# Patient Record
Sex: MALE | Race: BLACK OR AFRICAN AMERICAN | Employment: FULL TIME | URBAN - METROPOLITAN AREA
[De-identification: names, ages, dates, MRNs, and addresses within clinical notes are randomized per-mention and may not be internally consistent; named-entity substitution may affect disease eponyms.]

---

## 2017-06-02 ENCOUNTER — TELEPHONE (OUTPATIENT)
Dept: FAMILY MEDICINE | Facility: CLINIC | Age: 57
End: 2017-06-02

## 2017-06-02 NOTE — TELEPHONE ENCOUNTER
6/2/2017    Call Regarding Preventive Health Screening Colonoscopy    Attempt 1    Message on voicemail     Comments:       Outreach   gavin

## 2017-11-14 ENCOUNTER — HOSPITAL ENCOUNTER (OUTPATIENT)
Facility: CLINIC | Age: 57
Discharge: HOME | End: 2017-11-14
Attending: SPECIALIST | Admitting: SPECIALIST
Payer: COMMERCIAL

## 2017-11-14 ENCOUNTER — SURGERY (OUTPATIENT)
Age: 57
End: 2017-11-14

## 2017-11-14 VITALS
OXYGEN SATURATION: 96 % | RESPIRATION RATE: 17 BRPM | SYSTOLIC BLOOD PRESSURE: 125 MMHG | DIASTOLIC BLOOD PRESSURE: 86 MMHG | WEIGHT: 180 LBS | BODY MASS INDEX: 27.28 KG/M2 | HEIGHT: 68 IN

## 2017-11-14 LAB — COLONOSCOPY: NORMAL

## 2017-11-14 PROCEDURE — 88305 TISSUE EXAM BY PATHOLOGIST: CPT | Performed by: SPECIALIST

## 2017-11-14 PROCEDURE — 99153 MOD SED SAME PHYS/QHP EA: CPT | Performed by: SPECIALIST

## 2017-11-14 PROCEDURE — 88305 TISSUE EXAM BY PATHOLOGIST: CPT | Mod: 26 | Performed by: SPECIALIST

## 2017-11-14 PROCEDURE — G0500 MOD SEDAT ENDO SERVICE >5YRS: HCPCS

## 2017-11-14 PROCEDURE — 45385 COLONOSCOPY W/LESION REMOVAL: CPT | Performed by: SPECIALIST

## 2017-11-14 PROCEDURE — 25000128 H RX IP 250 OP 636: Performed by: SPECIALIST

## 2017-11-14 RX ORDER — LIDOCAINE 40 MG/G
CREAM TOPICAL
Status: DISCONTINUED | OUTPATIENT
Start: 2017-11-14 | End: 2017-11-14 | Stop reason: HOSPADM

## 2017-11-14 RX ORDER — ONDANSETRON 2 MG/ML
4 INJECTION INTRAMUSCULAR; INTRAVENOUS
Status: DISCONTINUED | OUTPATIENT
Start: 2017-11-14 | End: 2017-11-14 | Stop reason: HOSPADM

## 2017-11-14 RX ORDER — FENTANYL CITRATE 50 UG/ML
INJECTION, SOLUTION INTRAMUSCULAR; INTRAVENOUS PRN
Status: DISCONTINUED | OUTPATIENT
Start: 2017-11-14 | End: 2017-11-14 | Stop reason: HOSPADM

## 2017-11-14 RX ADMIN — MIDAZOLAM HYDROCHLORIDE 0.5 MG: 1 INJECTION, SOLUTION INTRAMUSCULAR; INTRAVENOUS at 07:52

## 2017-11-14 RX ADMIN — MIDAZOLAM HYDROCHLORIDE 0.5 MG: 1 INJECTION, SOLUTION INTRAMUSCULAR; INTRAVENOUS at 07:47

## 2017-11-14 RX ADMIN — MIDAZOLAM HYDROCHLORIDE 1 MG: 1 INJECTION, SOLUTION INTRAMUSCULAR; INTRAVENOUS at 07:40

## 2017-11-14 RX ADMIN — FENTANYL CITRATE 50 MCG: 50 INJECTION, SOLUTION INTRAMUSCULAR; INTRAVENOUS at 07:40

## 2017-11-14 RX ADMIN — FENTANYL CITRATE 25 MCG: 50 INJECTION, SOLUTION INTRAMUSCULAR; INTRAVENOUS at 07:47

## 2017-11-14 RX ADMIN — FENTANYL CITRATE 25 MCG: 50 INJECTION, SOLUTION INTRAMUSCULAR; INTRAVENOUS at 07:52

## 2017-11-14 NOTE — BRIEF OP NOTE
[facility] Brief Operative Note    Pre-operative diagnosis: Screening   Post-operative diagnosis polyp     Procedure: Procedure(s):  Colonoscopy - Wound Class: II-Clean Contaminated   Surgeon(s): Surgeon(s) and Role:     * Raad Valentino MD - Primary   Estimated blood loss: * No values recorded between 11/14/2017 12:00 AM and 11/14/2017  8:18 AM *    Specimens:   ID Type Source Tests Collected by Time Destination   A :  Polyp Large Intestine, Hepatic Flexure SURGICAL PATHOLOGY EXAM Raad Valentino MD 11/14/2017  7:58 AM       Findings: Please see ProVation procedure note in Chart Review

## 2017-11-15 LAB — COPATH REPORT: NORMAL

## 2017-12-07 ENCOUNTER — TELEPHONE (OUTPATIENT)
Dept: FAMILY MEDICINE | Facility: CLINIC | Age: 57
End: 2017-12-07

## 2018-05-21 ENCOUNTER — OFFICE VISIT (OUTPATIENT)
Dept: FAMILY MEDICINE | Facility: CLINIC | Age: 58
End: 2018-05-21
Payer: COMMERCIAL

## 2018-05-21 ENCOUNTER — RADIANT APPOINTMENT (OUTPATIENT)
Dept: GENERAL RADIOLOGY | Facility: CLINIC | Age: 58
End: 2018-05-21
Attending: FAMILY MEDICINE
Payer: COMMERCIAL

## 2018-05-21 VITALS
HEIGHT: 68 IN | SYSTOLIC BLOOD PRESSURE: 132 MMHG | BODY MASS INDEX: 27.89 KG/M2 | RESPIRATION RATE: 16 BRPM | HEART RATE: 73 BPM | TEMPERATURE: 98 F | DIASTOLIC BLOOD PRESSURE: 75 MMHG | WEIGHT: 184 LBS

## 2018-05-21 DIAGNOSIS — Z00.00 ROUTINE GENERAL MEDICAL EXAMINATION AT A HEALTH CARE FACILITY: ICD-10-CM

## 2018-05-21 DIAGNOSIS — Z11.4 ENCOUNTER FOR SCREENING FOR HIV: ICD-10-CM

## 2018-05-21 DIAGNOSIS — M54.42 LOW BACK PAIN WITH LEFT-SIDED SCIATICA, UNSPECIFIED BACK PAIN LATERALITY, UNSPECIFIED CHRONICITY: ICD-10-CM

## 2018-05-21 DIAGNOSIS — R05.9 COUGH: ICD-10-CM

## 2018-05-21 DIAGNOSIS — R20.1 HYPOESTHESIA: ICD-10-CM

## 2018-05-21 DIAGNOSIS — M75.102 ROTATOR CUFF SYNDROME, LEFT: ICD-10-CM

## 2018-05-21 DIAGNOSIS — M54.2 CERVICALGIA: Primary | ICD-10-CM

## 2018-05-21 DIAGNOSIS — Z11.59 NEED FOR HEPATITIS C SCREENING TEST: ICD-10-CM

## 2018-05-21 PROCEDURE — 87389 HIV-1 AG W/HIV-1&-2 AB AG IA: CPT | Performed by: FAMILY MEDICINE

## 2018-05-21 PROCEDURE — 71046 X-RAY EXAM CHEST 2 VIEWS: CPT

## 2018-05-21 PROCEDURE — 86803 HEPATITIS C AB TEST: CPT | Performed by: FAMILY MEDICINE

## 2018-05-21 PROCEDURE — 86480 TB TEST CELL IMMUN MEASURE: CPT | Performed by: FAMILY MEDICINE

## 2018-05-21 PROCEDURE — 36415 COLL VENOUS BLD VENIPUNCTURE: CPT | Performed by: FAMILY MEDICINE

## 2018-05-21 PROCEDURE — 99214 OFFICE O/P EST MOD 30 MIN: CPT | Performed by: FAMILY MEDICINE

## 2018-05-21 RX ORDER — BUPROPION HYDROCHLORIDE 150 MG/1
150 TABLET ORAL EVERY MORNING
Qty: 30 TABLET | Refills: 1 | Status: SHIPPED | OUTPATIENT
Start: 2018-05-21 | End: 2021-09-27

## 2018-05-21 NOTE — MR AVS SNAPSHOT
After Visit Summary   5/21/2018    Phani Alba    MRN: [id number]           Patient Information     Date Of Birth          1960        Visit Information        Provider Department      5/21/2018 9:15 AM Manan Thornton MD [facility]        Today's Diagnoses     Cervicalgia    -  1    Rotator cuff syndrome, left        Cough        Need for hepatitis C screening test        Encounter for screening for HIV        Routine general medical examination at a health care facility        Hypoesthesia        Low back pain with left-sided sciatica, unspecified back pain laterality, unspecified chronicity           Follow-ups after your visit        Additional Services     [facility] PT, HAND, AND CHIROPRACTIC REFERRAL       **This order will print in the [facility] Scheduling Office**    Physical Therapy, Hand Therapy and Chiropractic Care are available through:    *[facility]  *[facility]  *[facility]    Call one number to schedule at any of the above locations: [phone].    Your provider has referred you to:     Indication/Reason for Referral: Cervicalgia, left radicular hypoesthesia  Onset of Illness: weeks  Therapy Orders: Evaluate and Treat  Special Programs: None and [facility]  Special Request:     Alicja Helm      Additional Comments for the Therapist or Chiropractor:     Please be aware that coverage of these services is subject to the terms and limitations of your health insurance plan.  Call member services at your health plan with any benefit or coverage questions.      Please bring the following to your appointment:    *Your personal calendar for scheduling future appointments  *Comfortable clothing                  Follow-up notes from your care team     Return in about 6 weeks (around 7/2/2018).      Future tests that were ordered for you today     Open Future Orders        Priority Expected Expires Ordered    MR  "Shoulder Left w/o Contrast Routine  2019            Who to contact     If you have questions or need follow up information about today's clinic visit or your schedule please contact [facility] directly at [phone].  Normal or non-critical lab and imaging results will be communicated to you by MyChart, letter or phone within 4 business days after the clinic has received the results. If you do not hear from us within 7 days, please contact the clinic through Simparelhart or phone. If you have a critical or abnormal lab result, we will notify you by phone as soon as possible.  Submit refill requests through Travolver or call your pharmacy and they will forward the refill request to us. Please allow 3 business days for your refill to be completed.          Additional Information About Your Visit        MyCharVetr Information     Travolver lets you send messages to your doctor, view your test results, renew your prescriptions, schedule appointments and more. To sign up, go to [email or web address] . Click on \"Log in\" on the left side of the screen, which will take you to the Welcome page. Then click on \"Sign up Now\" on the right side of the page.     You will be asked to enter the access code listed below, as well as some personal information. Please follow the directions to create your username and password.     Your access code is: [email or web address]  Expires: 2018 10:34 AM     Your access code will  in 90 days. If you need help or a new code, please call your [facility] or [phone].        Care EveryWhere ID     This is your Care EveryWhere ID. This could be used by other organizations to access your [facility] medical records  [id number]        Your Vitals Were     Pulse Temperature Respirations Height BMI (Body Mass Index)       73 98  F (36.7  C) (Oral) 16 5' 8\" (1.727 m) 27.98 kg/m2        Blood Pressure from Last 3 Encounters:   18 132/75   17 125/86 "   02/04/16 138/85    Weight from Last 3 Encounters:   05/21/18 184 lb (83.5 kg)   11/14/17 180 lb (81.6 kg)   02/04/16 180 lb 9.6 oz (81.9 kg)              We Performed the Following     Hepatitis C Screen Reflex to HCV RNA Quant and Genotype     HIV Antigen Antibody Combo     [facility] PT, HAND, AND CHIROPRACTIC REFERRAL     M Tuberculosis by Quantiferon          Today's Medication Changes          These changes are accurate as of 5/21/18 12:27 PM.  If you have any questions, ask your nurse or doctor.               Start taking these medicines.        Dose/Directions    buPROPion 150 MG 24 hr tablet   Commonly known as:  WELLBUTRIN XL   Used for:  Cervicalgia   Started by:  Manan Thornton MD        Dose:  150 mg   Take 1 tablet (150 mg) by mouth every morning   Quantity:  30 tablet   Refills:  1         Stop taking these medicines if you haven't already. Please contact your care team if you have questions.     indomethacin 50 MG capsule   Commonly known as:  INDOCIN   Stopped by:  Manan Thornton MD           NO ACTIVE MEDICATIONS   Stopped by:  Manan Thornton MD           sildenafil 100 MG tablet   Commonly known as:  VIAGRA   Stopped by:  Manan hTornton MD                Where to get your medicines      These medications were sent to [facility] [place] [id number]     Phone:  [phone]     buPROPion 150 MG 24 hr tablet                Primary Care Provider Office Phone # Fax #    Manan Thornton [phone] [address] [place] [id number]        Equal Access to Services     [facility]: Hadii aad ku hadasho Soomaali, waaxda luqadaha, qaybta kaalmada adeegyada, taisha dowling hayjessica han . So [facility] [phone].    ATENCIÓN: Si habla español, tiene a graham disposición servicios gratuitos de asistencia lingüística. Llame al [phone].    We comply with applicable federal civil rights laws and  [place] laws. We do not discriminate on the basis of race, color, national origin, age, disability, sex, sexual orientation, or gender identity.            Thank you!     Thank you for choosing [facility]  for your care. Our goal is always to provide you with excellent care. Hearing back from our patients is one way we can continue to improve our services. Please take a few minutes to complete the written survey that you may receive in the mail after your visit with us. Thank you!             Your Updated Medication List - Protect others around you: Learn how to safely use, store and throw away your medicines at www.disposemymeds.org.          This list is accurate as of 5/21/18 12:27 PM.  Always use your most recent med list.                   Brand Name Dispense Instructions for use Diagnosis    buPROPion 150 MG 24 hr tablet    WELLBUTRIN XL    30 tablet    Take 1 tablet (150 mg) by mouth every morning    Cervicalgia

## 2018-05-21 NOTE — PROGRESS NOTES
SUBJECTIVE:   Phani Alba is a 57 year old male who presents to clinic today for the following health issues:    Cough   of unclear duration, episodic  Rotator cuff syndrome, left. 1.5 yers no trauma. Cannot abduct/flex beyond 90 degrees  Routine general medical examination at a health care facility. HIV, HEP C duse  Hypoesthesia left leg.foot, palliatives and provocatives not noted   Low back pain with left-sided sciatica, unspecified back pain laterality, unspecified chronicity. In the past, not currently  Cervicalgia 6 weeks, unresponsive to tylenol. No radiation, palliatives and provocatives not noted no trauma  Neck Pain  Works night shift and normally rests his head on the table when he is tired. Wonders if this caused this   Onset: 1 and a half months ago     Description:   Location: bilaterally  Radiation: into the head    Intensity: moderate    Progression of Symptoms:  worsening    Accompanying Signs & Symptoms:  Burning, prickly sensation (paresthesias) in arm(s): no   Numbness in arm(s): no   Weakness in arm(s):  no   Fever: no   Headache: no   Nausea and/or vomiting: no     History:   Trauma: no   Previous neck pain: no   Previous surgery or injections: no   Previous Imaging (MRI,X ray): no     Precipitating factors:   Does movement increase the pain:  YES    Alleviating factors:      Therapies Tried and outcome:  Tylenol         Problem list and histories reviewed & adjusted, as indicated.  Additional history: as documented      Past Medical History:   Diagnosis Date     ED (erectile dysfunction) 12/11/2014     Hypoesthesia 5/21/2018     Hypoesthesia 5/21/2018     Keloid of skin      Low back pain with left-sided sciatica, unspecified back pain laterality, unspecified chronicity 5/21/2018     Rotator cuff syndrome, left 5/21/2018     Sebaceous cyst 12/11/2014     Tubular adenoma of colon        Past Surgical History:   Procedure Laterality Date     APPENDECTOMY         Family History   Problem  "Relation Age of Onset     Family History Negative No family hx of        Social History   Substance Use Topics     Smoking status: Never Smoker     Smokeless tobacco: Never Used     Alcohol use Yes      Comment: occ       Reviewed and updated as needed this visit by clinical staff  Tobacco  Allergies  Meds  Problems  Fam Hx  Soc Hx      Reviewed and updated as needed this visit by Provider  Allergies  Meds  Problems         ROS:    ENT: NEGATIVE for congestion and soar throat  No CP        This document serves as a record of the services and decisions personally performed and made by Manan Thornton MD. It was created on his behalf by Renzo Fisher, a trained medical scribe.  The creation of this document is based on the scribe's personal observations and the provider's statements to the medical scribe.  Renzo Fisher, May 21, 2018 10:04 AM    OBJECTIVE:     /75 (BP Location: Left arm, Patient Position: Chair, Cuff Size: Adult Large)  Pulse 73  Temp 98  F (36.7  C) (Oral)  Resp 16  Ht 5' 8\" (1.727 m)  Wt 184 lb (83.5 kg)  BMI 27.98 kg/m2  Body mass index is 27.98 kg/(m^2).    NECK: FROM, no neurotension signs, no cervical adenopathy noted, tenderness to palpation along trapezius , nuchal ridge  Affect flat, CN II-XII grossly symmetric     MS: Reduced range of motion L arm, unable to abduct beyond 90 degrees.   Neg SLR  Diagnostic Test Results:  No results found for this or any previous visit (from the past 24 hour(s)).  cxr neg  ASSESSMENT/PLAN:   ASSESSMENT / PLAN:  (M54.2) Cervicalgia  (primary encounter diagnosis)  Comment: refer treat  Plan: [facility] PT, HAND, AND CHIROPRACTIC REFERRAL,         buPROPion (WELLBUTRIN XL) 150 MG 24 hr tablet            (M75.102) Rotator cuff syndrome, left  Comment: suspect derangement  Plan: MR Shoulder Left w/o Contrast            (R05) Cough  Comment: broaden data base    Plan: M Tuberculosis by Quantiferon, XR Chest 2 Views        Does not appear related to " above    (Z11.59) Need for hepatitis C screening test  Comment: routine discussed  Plan: Hepatitis C Screen Reflex to HCV RNA Quant and         Genotype            (Z11.4) Encounter for screening for HIV  Comment: routine discussed  Plan: HIV Antigen Antibody Combo            (Z00.00) Routine general medical examination at a health care facility  Comment: discussed  Plan: Hepatitis C Screen Reflex to HCV RNA Quant and         Genotype, HIV Antigen Antibody Combo            (R20.1) Hypoesthesia  Comment: with Dx below, suspect radicular cause  Plan: [facility] PT, HAND, AND CHIROPRACTIC REFERRAL            (M54.42) Low back pain with left-sided sciatica, unspecified back pain laterality, unspecified chronicity  Comment: History of, no current pain, but radicular Sx  Plan: [facility] PT, HAND, AND CHIROPRACTIC REFERRAL              RTC 6 weeks    Manan Thornton MD        The information in this document, created by the medical scribe for me, accurately reflects the services I personally performed and the decisions made by me. I have reviewed and approved this document for accuracy prior to leaving the patient care area.  Manan Thornton MD May 21, 2018 10:04 AM

## 2018-05-21 NOTE — LETTER
May 25, 2018      Phani Alba  [address] [address]        Dear ,    We are writing to inform you of your test results.    All tests are normal, except that tuberculosis test.  As you can see, they recommend retesting it in 1 or 2 months    Resulted Orders   Hepatitis C Screen Reflex to HCV RNA Quant and Genotype   Result Value Ref Range    Hepatitis C Antibody Nonreactive NR^Nonreactive      Comment:      Assay performance characteristics have not been established for newborns,   infants, and children     HIV Antigen Antibody Combo   Result Value Ref Range    HIV Antigen Antibody Combo Nonreactive NR^Nonreactive          Comment:      HIV-1 p24 Ag & HIV-1/HIV-2 Ab Not Detected   M Tuberculosis by Quantiferon   Result Value Ref Range    M Tuberculosis Result Indeterminate (A) NEG^Negative      Comment:      Results are indeterminate, possible impaired immune response.  Consider   submitting a repeat sample in 1 to 2 months.      M Tuberculosis Antigen Value 0.00 IU/mL      Comment:      This is a qualitative test.  The TB antigen IU/mL value is required for   documentation on certain government reporting forms but this value should not   be used to monitor disease progression or response to therapy.  Diagnosing or excluding tuberculosis disease, and assessing the probability of   LTBI, require a combination of epidemiological, historical, medical and   diagnostic findings that should be taken into account when interpreting   QuantiFERON TB results.         If you have any questions or concerns, please call the clinic at the number listed above.       Sincerely,        Manan Thornton MD/AL

## 2018-05-22 LAB
HCV AB SERPL QL IA: NONREACTIVE
HIV 1+2 AB+HIV1 P24 AG SERPL QL IA: NONREACTIVE

## 2018-05-23 LAB
M TB TUBERC IFN-G BLD QL: ABNORMAL
M TB TUBERC IFN-G/MITOGEN IGNF BLD: 0 IU/ML

## 2018-05-24 ENCOUNTER — TELEPHONE (OUTPATIENT)
Dept: FAMILY MEDICINE | Facility: CLINIC | Age: 58
End: 2018-05-24

## 2018-05-24 DIAGNOSIS — R76.11 NONSPECIFIC REACTION TO TUBERCULIN SKIN TEST WITHOUT ACTIVE TUBERCULOSIS: Primary | ICD-10-CM

## 2018-05-24 NOTE — PROGRESS NOTES
All tests are normal, except that tuberculosis test.  As you can see, they recommend retesting it in 1 or 2 months  QUEENIE PACHECO

## 2018-06-06 ENCOUNTER — THERAPY VISIT (OUTPATIENT)
Dept: PHYSICAL THERAPY | Facility: CLINIC | Age: 58
End: 2018-06-06
Payer: COMMERCIAL

## 2018-06-06 DIAGNOSIS — M54.2 CERVICALGIA: Primary | ICD-10-CM

## 2018-06-06 PROCEDURE — 97161 PT EVAL LOW COMPLEX 20 MIN: CPT | Mod: GP | Performed by: PHYSICAL THERAPIST

## 2018-06-06 PROCEDURE — 97110 THERAPEUTIC EXERCISES: CPT | Mod: GP | Performed by: PHYSICAL THERAPIST

## 2018-06-06 PROCEDURE — 97112 NEUROMUSCULAR REEDUCATION: CPT | Mod: GP | Performed by: PHYSICAL THERAPIST

## 2018-06-06 NOTE — PROGRESS NOTES
[facility] Initial Evaluation -- Cervical    Evaluation Date: June 6, 2018  Phani Alba is a 57 year old male with a cervical condition.   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    HISTORY:    Present symptoms:  B neck.  Pain quality (sharp/shooting/stabbing/aching/burning/cramping):   aching.  Paresthesia (yes/no):  no    Present since (onset date): 05/15/2018.     Symptoms (improving/unchanging/worsening):  improving.    Symptoms commenced as a result of: unknown--pt wonders if sitting at his work with his head resting forward on a table contributed   Condition occurred in the following environment:  unknown    Symptoms at onset (neck/arm/forearm/headache): B neck pain  Constant symptoms (neck/arm/forearm/headache): B neck pain  Intermittent symptoms (neck/arm/forearm/headache): none    Symptoms are made worse with the following: Always Bending, Always Sitting 1 hour, Always Turning, time of day - Always AM, Always When still; always looking down  Symptoms are made better with the following: Always When still    Disturbed sleep (yes/no): yes  Number of pillows: 1  Sleeping postures (prone/sup/side R/L): sides    Previous episodes (0/1-5/6-10/11+): 0 Year of first episode: na    Previous history: none  Previous treatments: na    Specific Questions: (as reported by the patient)  Dizziness/Tinnitus/Nausea/Swallowing (pos/neg): neg  Gait/Upper Limbs (normal/abnormal): normal  Medications (nil/NSAIDS/anlag/steroids/anticoag/other):  Other - Anti-depressants  Medical allergies:  none  General health  (excellent/good/fair/poor):  good  Pertinent medical history:  None  Imaging (None/Xray/MRI/Other):  none  Recent or major surgery (yes/no): no  Night pain (yes/no): no  Accidents (yes/no): no  Unexplained weight loss (yes/no): no  Barriers at home: no  Other red flags: no    EXAMINATION    Posture:   Sitting (good/fair/poor): poor  Standing (good/fair/poor): good     Protruded head (yes/no): yes    Wry Neck (right/left/nil):  nil  Relevant (yes/no):  na     Correction of posture(better/worse/no effect): NE  Other observations:  none    Neurological:    Motor Deficit:  Not assessed--no radicular complaints   Reflexes:  Not assessed--no radicular complaints  Sensory Deficit:  Not assessed--no radicular complaints   Dural signs:  Not assessed--no radicular complaints      Movement Loss:   Jasper Mod Min Nil Pain   Protrusion    x NE   Flexion    x Increases  B neck pain   Retraction   x  Increases B neck pain   Extension   x  Increases B neck pain   Lateral flexion R    x Increases L neck pain   Lateral flexion L    x Increases R neck pain   Rotation R  x x  Increases L neck pain   Rotation L  x x  Increases R neck pain     Test Movements:   During: produces, abolishes, increases, decreases, no effect, centralizing, peripheralizing  After: better, worse, no better, no worse, no effect, centralized, peripheralized    Pretest symptoms sitting: B neck pain 7/10   Symptoms During Symptoms After ROM increased ROM decreased No Effect   PRO        Rep PRO        RET No Effect No Effect      Rep RET W/self-OP--  decreases Better x     RET EXT No Effect No Effect      Rep RET EXT Decreases Better x     Pretest symptoms lying:     Symptoms During Symptoms After ROM increased ROM decreased No Effect   RET        Rep RET        RET EXT        Rep RET EXT        If required, pretest symptoms sitting:      Symptoms During Symptoms After ROM increased ROM decreased No Effect   LF-R        Rep LF-R        LF-L        Rep LF-L         ROT-R        Rep ROT-R        ROT-L        Rep ROT-L        FLEX        Rep FLEX            Static Tests:   Protrusion:    Flexion:    Retraction:    Extension (sitting/prone/supine):      Other Tests:     Provisional Classification:  Derangement - Bilateral, symmetrical, symptoms above elbow    Principle of Management:  Education:  Posture--use of lumbar roll in sitting, avoid fwd head, importance of posture    Equipment provided:  Purchased lumbar roll  Mechanical therapy (Y/N):  y   Extension principle:  Seated cervical retraction/extension x10 reps, every 2 hours   Lateral principle:    Flexion principle:     Other:      ASSESSMENT/PLAN:    Patient is a 57 year old male with cervical complaints.  Provisional classification of derangement with directional preference for extension.  He had limited B rotation and extension ROM initially that improved with repeated seated cervical retraction/extension.  He will try at home to assess further.  Treatment will focus on cervical retraction/extension exercises in addition to posture and body mechanics training to improve cervical mechanics, decrease pain, and improve overall function.     Patient has the following significant findings with corresponding treatment plan.                Diagnosis 1:  Neck pain  Pain -  self management, education, directional preference exercise and home program  Decreased ROM/flexibility - manual therapy, therapeutic exercise and home program  Decreased function - therapeutic activities and home program  Impaired posture - neuro re-education and home program    Therapy Evaluation Codes:   1) History comprised of:   Personal factors that impact the plan of care:      None.    Comorbidity factors that impact the plan of care are:      None.     Medications impacting care: None.  2) Examination of Body Systems comprised of:   Body structures and functions that impact the plan of care:      Cervical spine.   Activity limitations that impact  the plan of care are:      Bending, Driving and Sitting.  3) Clinical presentation characteristics are:   Stable/Uncomplicated.2  4) Decision-Making    Low complexity using standardized patient assessment instrument and/or measureable assessment of functional outcome.  Cumulative Therapy Evaluation is: Low complexity.    Previous and current functional limitations:  (See Goal Flow Sheet for this information)    Short term and Long term goals: (See Goal Flow Sheet for this information)     Communication ability:  Patient appears to be able to clearly communicate and understand verbal and written communication and follow directions correctly.  Treatment Explanation - The following has been discussed with the patient:   RX ordered/plan of care  Anticipated outcomes  Possible risks and side effects  This patient would benefit from PT intervention to resume normal activities.   Rehab potential is good.    Frequency:  1 X week, once daily  Duration:  for 4 weeks tapering to 2 X a month over 1 month   Discharge Plan:  Achieve all LTG.  Independent in home treatment program.  Reach maximal therapeutic benefit.    Please refer to the daily flowsheet for treatment today, total treatment time and time spent performing 1:1 timed codes.

## 2018-06-06 NOTE — MR AVS SNAPSHOT
After Visit Summary   6/6/2018    Phani Alba    MRN: [id number]           Patient Information     Date Of Birth          1960        Visit Information        Provider Department      6/6/2018 9:10 AM Cindi Bermudez PT [place] [facility] Physical Therapy        Today's Diagnoses     Cervicalgia    -  1       Follow-ups after your visit        Your next 10 appointments already scheduled     Jun 07, 2018  9:30 AM CDT   MR SHOULDER LEFT W/O CONTRAST with SHMRP1   [facility] ([facility])    [address] [place] [id number] [phone]           Take your medicines as usual, unless your doctor tells you not to. Bring a list of your current medicines to your exam (including vitamins, minerals and over-the-counter drugs). Also bring the results of similar scans you may have had.  Please remove any body piercings and hair extensions before you arrive.  Follow your doctor s orders. If you do not, we may have to postpone your exam.  You may or may not receive IV contrast for this exam pending the discretion of the Radiologist.  You do not need to do anything special to prepare.  The MRI machine uses a strong magnet. Please wear clothes without metal (snaps, zippers). A sweatsuit works well, or we may give you a hospital gown.   **IMPORTANT** THE INSTRUCTIONS BELOW ARE ONLY FOR THOSE PATIENTS WHO HAVE BEEN PRESCRIBED SEDATION OR GENERAL ANESTHESIA DURING THEIR MRI PROCEDURE:  IF YOUR DOCTOR PRESCRIBED ORAL SEDATION (take medicine to help you relax during your exam):   You must get the medicine from your doctor (oral medication) before you arrive. Bring the medicine to the exam. Do not take it at home. You ll be told when to take it upon arriving for your exam.   Arrive one hour early. Bring someone who can take you home after the test. Your medicine will make you sleepy. After the exam, you may not drive, take a bus or take a taxi by  yourself.  IF YOUR DOCTOR PRESCRIBED IV SEDATION:   Arrive one hour early. Bring someone who can take you home after the test. Your medicine will make you sleepy. After the exam, you may not drive, take a bus or take a taxi by yourself.   No eating 6 hours before your exam. You may have clear liquids up until 4 hours before your exam. (Clear liquids include water, clear tea, black coffee and fruit juice without pulp.)  IF YOUR DOCTOR PRESCRIBED ANESTHESIA (be asleep for your exam):   Arrive 1 1/2 hours early. Bring someone who can take you home after the test. You may not drive, take a bus or take a taxi by yourself.   No eating 8 hours before your exam. You may have clear liquids up until 4 hours before your exam. (Clear liquids include water, clear tea, black coffee and fruit juice without pulp.)   You will spend four to five hours in the recovery room.  Please call the Imaging Department at your exam site with any questions.            Jun 13, 2018  9:10 AM CDT   [facility] with Cindi Bermudez PT   [place] [facility] Physical Therapy ([facility]  )    [address] [address] [phone]              Who to contact     If you have questions or need follow up information about today's clinic visit or your schedule please contact [facility] PHYSICAL THERAPY directly at [phone].  Normal or non-critical lab and imaging results will be communicated to you by MyChart, letter or phone within 4 business days after the clinic has received the results. If you do not hear from us within 7 days, please contact the clinic through MyChart or phone. If you have a critical or abnormal lab result, we will notify you by phone as soon as possible.  Submit refill requests through [employer] or call your pharmacy and they will forward the refill request to us. Please allow 3 business days for your refill to be completed.           "Additional Information About Your Visit        MyChart Information     [facility] lets you send messages to your doctor, view your test results, renew your prescriptions, schedule appointments and more. To sign up, go to [email or web address] . Click on \"Log in\" on the left side of the screen, which will take you to the Welcome page. Then click on \"Sign up Now\" on the right side of the page.     You will be asked to enter the access code listed below, as well as some personal information. Please follow the directions to create your username and password.     Your access code is: [email or web address]  Expires: 2018 10:34 AM     Your access code will  in 90 days. If you need help or a new code, please call your [place] clinic or [phone].        Care EveryWhere ID     This is your Care EveryWhere ID. This could be used by other organizations to access your [place] medical records  [id number]         Blood Pressure from Last 3 Encounters:   18 132/75   17 125/86   16 138/85    Weight from Last 3 Encounters:   18 83.5 kg (184 lb)   17 81.6 kg (180 lb)   16 81.9 kg (180 lb 9.6 oz)              We Performed the Following     MELANY Inital Eval Report     Neuromuscular Re-Education     PT Eval, Low Complexity (71450)     Therapeutic Exercises        Primary Care Provider Office Phone # Fax #    Manan Thornton [phone] [address] [facility] [address]        Equal Access to Services     [facility]: Hadii aad ku hadasho Soomaali, waaxda luqadaha, qaybta kaalmada taisha bland. So [facility] [place] [facility] [phone].    ATENCIÓN: Si habla español, tiene a graham disposición servicios gratuitos de asistencia lingüística. Llame al [phone].    We comply with applicable federal civil rights laws and [place] laws. We do not discriminate on the basis of race, color, national origin, age, disability, sex, sexual orientation, or " gender identity.            Thank you!     Thank you for choosing [facility] PH[facility]ICAL THERAPY  for your care. Our goal is always to provide you with excellent care. Hearing back from our patients is one way we can continue to improve our services. Please take a few minutes to complete the written survey that you may receive in the mail after your visit with us. Thank you!             Your Updated Medication List - Protect others around you: Learn how to safely use, store and throw away your medicines at www.disposemymeds.org.          This list is accurate as of 6/6/18  9:55 AM.  Always use your most recent med list.                   Brand Name Dispense Instructions for use Diagnosis    buPROPion 150 MG 24 hr tablet    WELLBUTRIN XL    30 tablet    Take 1 tablet (150 mg) by mouth every morning    Cervicalgia

## 2018-06-07 ENCOUNTER — HOSPITAL ENCOUNTER (OUTPATIENT)
Dept: MRI IMAGING | Facility: CLINIC | Age: 58
Discharge: HOME | End: 2018-06-07
Attending: FAMILY MEDICINE | Admitting: FAMILY MEDICINE
Payer: COMMERCIAL

## 2018-06-07 DIAGNOSIS — M75.102 ROTATOR CUFF SYNDROME, LEFT: ICD-10-CM

## 2018-06-07 PROCEDURE — 73221 MRI JOINT UPR EXTREM W/O DYE: CPT | Mod: LT

## 2018-06-07 NOTE — LETTER
June 12, 2018      Phani Alba  [address] [place] [address]        Dear ,    It looks like the shoulder tendons are almost gone. I have referred you to the orthopedists. They will call you.      Resulted Orders   MR Shoulder Left w/o Contrast    Narrative    MR SHOULDER LEFT WITHOUT CONTRAST June 7, 2018 10:07 AM    HISTORY: Rotator cuff syndrome, left.    TECHNIQUE: Coronal oblique T1, T2, and fat suppressed T2, sagittal  oblique T2, and transverse proton density and T2 weighted images.    FINDINGS:   Osseous acromial outlet: There is a type III subacromial  configuration. Small subacromial spur is also noted at the  coracoacromial ligament attachment. There may be mild degenerative  changes at the acromioclavicular joint without indentation upon the  supraspinatus outlet.    Rotator cuff: Focal high signal intensity is noted within the  supraspinatus tendon at the greater tuberosity attachment. Although  not well seen due to mild patient motion artifact, this is suspicious  for rim rent tear extending through approximately 80% of the tendon  footplate thickness. Note jany tendon rupture or retraction is  demonstrated. There is no rotator cuff muscle atrophy.    Labral Structures: Evaluation of the glenoid labrum is also limited by  patient motion artifact. No tear or paralabral cyst is visible.    Biceps Tendon: No long head biceps tendon tear, subluxation, or  tendinosis.    Osseous structures: Marrow signal about the shoulder appears within  normal limits.    Joint space: No glenohumeral joint effusion.    Additional findings: No abnormal bursal signal. No deltoid muscle  edema.       Impression    IMPRESSION:   1. Supraspinatus tendon focal intrasubstance linear signal at the  greater tuberosity attachment worrisome for rim rent tear. This is not  well seen due to patient motion artifact and communication to the  tendon articular or bursal surface cannot be confirmed. This  appears  to extend through approximately 80% of the tendon footplate thickness.  No tendon rupture/retraction.  2. Subacromial spur and type III subacromial configuration, findings  which could be associated with supraspinatus impingement. No  subacromial bursal fluid.    EDSON DOE MD       If you have any questions or concerns, please call the clinic at the number listed above.       Sincerely,        Manan Thornton MD

## 2018-06-11 ENCOUNTER — TELEPHONE (OUTPATIENT)
Dept: FAMILY MEDICINE | Facility: CLINIC | Age: 58
End: 2018-06-11

## 2018-06-11 DIAGNOSIS — M75.112 INCOMPLETE TEAR OF LEFT ROTATOR CUFF: Primary | ICD-10-CM

## 2018-06-12 NOTE — PROGRESS NOTES
Looks like the shoulder tendons are almost gone. I have referred you to the orthopedists. They will call  QUEENIE PACHECO

## 2018-06-13 ENCOUNTER — THERAPY VISIT (OUTPATIENT)
Dept: PHYSICAL THERAPY | Facility: CLINIC | Age: 58
End: 2018-06-13
Payer: COMMERCIAL

## 2018-06-13 DIAGNOSIS — M54.2 CERVICALGIA: ICD-10-CM

## 2018-06-13 PROCEDURE — 97110 THERAPEUTIC EXERCISES: CPT | Mod: GP | Performed by: PHYSICAL THERAPIST

## 2018-06-21 ENCOUNTER — OFFICE VISIT (OUTPATIENT)
Dept: ORTHOPEDICS | Facility: CLINIC | Age: 58
End: 2018-06-21
Payer: COMMERCIAL

## 2018-06-21 VITALS — DIASTOLIC BLOOD PRESSURE: 82 MMHG | BODY MASS INDEX: 27.98 KG/M2 | SYSTOLIC BLOOD PRESSURE: 122 MMHG | WEIGHT: 184 LBS

## 2018-06-21 DIAGNOSIS — M75.112 INCOMPLETE TEAR OF LEFT ROTATOR CUFF: Primary | ICD-10-CM

## 2018-06-21 PROCEDURE — 99203 OFFICE O/P NEW LOW 30 MIN: CPT | Performed by: ORTHOPAEDIC SURGERY

## 2018-06-21 NOTE — PROGRESS NOTES
HISTORY OF PRESENT ILLNESS:    Phani Alba is a 57 year old male who is seen in consultation at the request of Dr. Thornton for left rotator cuff  tear. right hand dominant.     Present symptoms:  Limited ROM above chest height due to weakness, mild pain with motion overhead.  Patient states while in the shower with hot water running on the area he is able to raise arm overhead, but after he gets out of the shower his ROM is limited once again.  Treatments tried to this point: Home exercise, Tylenol, Ibuprofen  Orthopedic PMH: none     Past Medical History:   Diagnosis Date     ED (erectile dysfunction) 12/11/2014     Hypoesthesia 5/21/2018     Hypoesthesia 5/21/2018     Keloid of skin      Low back pain with left-sided sciatica, unspecified back pain laterality, unspecified chronicity 5/21/2018     Rotator cuff syndrome, left 5/21/2018     Sebaceous cyst 12/11/2014     Tubular adenoma of colon        Past Surgical History:   Procedure Laterality Date     APPENDECTOMY         Family History   Problem Relation Age of Onset     Family History Negative No family hx of        Social History     Social History     Marital status:      Spouse name: N/A     Number of children: 3     Years of education: N/A     Occupational History     [employer]     Social History Main Topics     Smoking status: Never Smoker     Smokeless tobacco: Never Used     Alcohol use Yes      Comment: occ     Drug use: No     Sexual activity: Yes     Partners: Female     Other Topics Concern     Not on file     Social History Narrative       Current Outpatient Prescriptions   Medication Sig Dispense Refill     buPROPion (WELLBUTRIN XL) 150 MG 24 hr tablet Take 1 tablet (150 mg) by mouth every morning 30 tablet 1       No Known Allergies    REVIEW OF SYSTEMS:  CONSTITUTIONAL:  NEGATIVE for fever, chills, change in weight  INTEGUMENTARY/SKIN:  NEGATIVE for worrisome rashes, moles or lesions  EYES:  NEGATIVE for vision changes or  irritation  ENT/MOUTH:  NEGATIVE for ear, mouth and throat problems  RESP:  NEGATIVE for significant cough or SOB  BREAST:  NEGATIVE for masses, tenderness or discharge  CV:  NEGATIVE for chest pain, palpitations or peripheral edema  GI:  NEGATIVE for nausea, abdominal pain, heartburn, or change in bowel habits  :  Negative   MUSCULOSKELETAL:  See HPI above  NEURO:  NEGATIVE for weakness, dizziness or paresthesias  ENDOCRINE:  NEGATIVE for temperature intolerance, skin/hair changes  HEME/ALLERGY/IMMUNE:  NEGATIVE for bleeding problems  PSYCHIATRIC:  NEGATIVE for changes in mood or affect      PHYSICAL EXAM:  There were no vitals taken for this visit.  There is no height or weight on file to calculate BMI.   GENERAL APPEARANCE: healthy, alert and no distress   SKIN: no suspicious lesions or rashes  NEURO: Normal strength and tone, mentation intact and speech normal  VASCULAR: Good pulses, and capillary refill   LYMPH: no lymphadenopathy   PSYCH:  mentation appears normal and affect normal/bright    MSK:  Examination of the neck and shoulder girdle musculature reveals no asymmetry, or atrophy to the muscle masses.  There is no erythema, ecchymosis or edema.  There is a normal lordosis to the cervical and lumbar spine regions.  There is a normal kyphosis to the thoracic spine.  There is no clinical evidence of scoliosis. There is no tenderness to palpation or percussion.  There is no paraspinal muscle spasm present.  There is a Normal range of motion to the cervical spine. Forward flexion to 45, extension to 55, R and L lateral bending to 45, and rotation to 70, both R and L.    Strength : Bicep 5/5   C5-6       Sensation :     Deltoid 5/5   C5    Lateral Arm    Wrist extensors 5/5  C6    Thumb    Tricep  5/5   C7    Middle Finger    Finger flexors  5/5  C8    Little Finger    Interossei  5/5   T1    Medial Arm    Reflexes :  Bicep   Symmetric  C5-6    BR Symmetric  C6    Tricep Symmetric  C7    Shoulder:   Examination of the left shoulder reveals a full active ROM and full passive ROM.    Forward Flexion : 180 degrees Pain  YES --   Abduction  :  180 degrees  YES --   Internal Rotation : thoracic 10   YES --  Subscap Lift-off test negative  External Rotation :    0 Deg-90  90 Deg- 90  Contralateral side symmetric    There is no tenderness to palpation about the AC joint, anterior capsule or Bicep tendon.  There is mild tenderness to palpation in the subacromial space.  There is no rotator cuff weakness.  Speed's an Yergason's Tests for Bicep pathology are negative. Arm adduction does not cause pain in the AC joint.  Apprehension sign is negative. Scapular winging is not present. ROM of the elbow and wrist is normal and CMS is intact to fingertips.                 ASSESSMENT / PLAN: Partial-thickness rotator cuff tear left shoulder.  He also has a type III acromion.  I described the pathology at length with him and discussed the treatment options.  At this point he is just going to continue with physical therapy with which he has begun for his neck, and return to see me if he would like to get a corticosteroid injection to the subacromial space.  I told him that if he had any sudden onset of weakness in the shoulder he should return and we will reassess the partial thickness tear.         Imaging Interpretation:      MR SHOULDER LEFT WITHOUT CONTRAST June 7, 2018 10:07 AM     HISTORY: Rotator cuff syndrome, left.     TECHNIQUE: Coronal oblique T1, T2, and fat suppressed T2, sagittal  oblique T2, and transverse proton density and T2 weighted images.     FINDINGS:   Osseous acromial outlet: There is a type III subacromial  configuration. Small subacromial spur is also noted at the  coracoacromial ligament attachment. There may be mild degenerative  changes at the acromioclavicular joint without indentation upon the  supraspinatus outlet.     Rotator cuff: Focal high signal intensity is noted within the  supraspinatus  tendon at the greater tuberosity attachment. Although  not well seen due to mild patient motion artifact, this is suspicious  for rim rent tear extending through approximately 80% of the tendon  footplate thickness. Note jany tendon rupture or retraction is  demonstrated. There is no rotator cuff muscle atrophy.     Labral Structures: Evaluation of the glenoid labrum is also limited by  patient motion artifact. No tear or paralabral cyst is visible.     Biceps Tendon: No long head biceps tendon tear, subluxation, or  tendinosis.     Osseous structures: Marrow signal about the shoulder appears within  normal limits.     Joint space: No glenohumeral joint effusion.     Additional findings: No abnormal bursal signal. No deltoid muscle  edema.          IMPRESSION:   1. Supraspinatus tendon focal intrasubstance linear signal at the  greater tuberosity attachment worrisome for rim rent tear. This is not  well seen due to patient motion artifact and communication to the  tendon articular or bursal surface cannot be confirmed. This appears  to extend through approximately 80% of the tendon footplate thickness.  No tendon rupture/retraction.  2. Subacromial spur and type III subacromial configuration, findings  which could be associated with supraspinatus impingement. No  subacromial bursal fluid.     MD Gumaro SYKES MD  Department of Orthopedic Surgery

## 2018-06-27 ENCOUNTER — THERAPY VISIT (OUTPATIENT)
Dept: PHYSICAL THERAPY | Facility: CLINIC | Age: 58
End: 2018-06-27
Payer: COMMERCIAL

## 2018-06-27 DIAGNOSIS — M54.2 CERVICALGIA: ICD-10-CM

## 2018-06-27 PROCEDURE — 97110 THERAPEUTIC EXERCISES: CPT | Mod: GP | Performed by: PHYSICAL THERAPIST

## 2018-06-27 PROCEDURE — 97530 THERAPEUTIC ACTIVITIES: CPT | Mod: GP | Performed by: PHYSICAL THERAPIST

## 2018-06-27 NOTE — MR AVS SNAPSHOT
After Visit Summary   6/27/2018    Phani Alba    MRN: [id number]           Patient Information     Date Of Birth          1960        Visit Information        Provider Department      6/27/2018 9:10 AM Cindi Bermudez PT [facility] Physical Therapy        Today's Diagnoses     Cervicalgia           Follow-ups after your visit        Who to contact     If you have questions or need follow up information about today's clinic visit or your schedule please contact [facility] A[facility]IC [facility] PHYSICAL THERAPY directly at [phone].  Normal or non-critical lab and imaging results will be communicated to you by [employer], letter or phone within 4 business days after the clinic has received the results. If you do not hear from us within 7 days, please contact the clinic through [employer] or phone. If you have a critical or abnormal lab result, we will notify you by phone as soon as possible.  Submit refill requests through [employer] or call your pharmacy and they will forward the refill request to us. Please allow 3 business days for your refill to be completed.          Additional Information About Your Visit        MyChart Information     [employer] gives you secure access to your electronic health record. If you see a primary care provider, you can also send messages to your care team and make appointments. If you have questions, please call your primary care clinic.  If you do not have a primary care provider, please call [phone] and they will assist you.        Care EveryWhere ID     This is your Care EveryWhere ID. This could be used by other organizations to access your [place] medical records  [id number]         Blood Pressure from Last 3 Encounters:   06/21/18 122/82   05/21/18 132/75   11/14/17 125/86    Weight from Last 3 Encounters:   06/21/18 83.5 kg (184 lb)   05/21/18 83.5 kg (184 lb)   11/14/17 81.6 kg (180 lb)              We  Performed the Following     MELANY Progress Notes Report     Therapeutic Activities     Therapeutic Exercises        Primary Care Provider Office Phone # Fax #    Manan Thornton [phone] [address] [facility] [facility] [address]        Equal Access to Services     HIMANSHUYANI JOE : Hadii aad ku hadarmandoo Soomaali, waaxda luqadaha, qaybta kaalmada adeegyada, taisha carballon jose august laisabellamie . So [facility] [phone].    ATENCIÓN: Si habla español, tiene a graham disposición servicios gratuitos de asistencia lingüística. Llame al [phone].    We comply with applicable federal civil rights laws and [place] laws. We do not discriminate on the basis of race, color, national origin, age, disability, sex, sexual orientation, or gender identity.            Thank you!     Thank you for choosing [place] [facility] PHYSICAL THERAPY  for your care. Our goal is always to provide you with excellent care. Hearing back from our patients is one way we can continue to improve our services. Please take a few minutes to complete the written survey that you may receive in the mail after your visit with us. Thank you!             Your Updated Medication List - Protect others around you: Learn how to safely use, store and throw away your medicines at www.disposemymeds.org.          This list is accurate as of 6/27/18 10:47 AM.  Always use your most recent med list.                   Brand Name Dispense Instructions for use Diagnosis    buPROPion 150 MG 24 hr tablet    WELLBUTRIN XL    30 tablet    Take 1 tablet (150 mg) by mouth every morning    Cervicalgia

## 2018-06-27 NOTE — PROGRESS NOTES
Subjective:  HPI                    Objective:  System    Physical Exam    General     ROS    Assessment/Plan:    DISCHARGE REPORT    Progress reporting period is from 06/06/2018 to 06/27/2018.       SUBJECTIVE  Subjective: Patient reports his neck feels nearly back to normal.  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 lying on a couch on his side without neck support or sometimes leaning forward while sitting and resting his head in his hands on a table putting his neck in a flexed position.  He has been doing his exercises 3x/day which make him feel better.      Current Pain level: 0/10.     Initial Pain level: 7/10.   Changes in function:  Yes, improved neck motion without pain, improved sitting tolerance without pain.  Adverse reaction to treatment or activity: None    OBJECTIVE  Objective: Cervical AROM:  B rotation WNL, extension WNL, B SB WNL--no pain with any cervical AROM.       ASSESSMENT/PLAN  Patient has been seen for 3 visits with treatment focusing on cervical retraction/extension exercises and posture training to address his neck pain.  He has made excellent progress since his initial visit.  He reports near resolution of his pain, and his cervical mobility and overall function have improved.  He should do well continuing with his HEP independently at this point.    Updated problem list and treatment plan: Diagnosis 1:  Neck pain  Decreased function - home program  Impaired posture - home program  STG/LTGs have been met or progress has been made towards goals:  Yes (See Goal flow sheet completed today.)  Assessment of Progress: The patient's condition is improving.  Self Management Plans:  Patient has been instructed in a home treatment program.  Patient is independent in a home treatment program.  Patient  has been instructed in self management of symptoms.  Patient is independent in self management of symptoms.  Pending sale to [facility] continues to require the  following intervention to meet STG and LTG's:  PT intervention is no longer required to meet STG/LTG.    Recommendations:  This patient is ready to be discharged from therapy and continue their home treatment program.    Please refer to the daily flowsheet for treatment today, total treatment time and time spent performing 1:1 timed codes.

## 2018-07-09 DIAGNOSIS — R76.11 NONSPECIFIC REACTION TO TUBERCULIN SKIN TEST WITHOUT ACTIVE TUBERCULOSIS: ICD-10-CM

## 2018-07-09 PROCEDURE — 86480 TB TEST CELL IMMUN MEASURE: CPT | Performed by: FAMILY MEDICINE

## 2018-07-09 PROCEDURE — 36415 COLL VENOUS BLD VENIPUNCTURE: CPT | Performed by: FAMILY MEDICINE

## 2018-07-11 LAB
M TB TUBERC IFN-G BLD QL: NEGATIVE
M TB TUBERC IFN-G/MITOGEN IGNF BLD: 0.02 IU/ML

## 2018-10-11 ENCOUNTER — OFFICE VISIT (OUTPATIENT)
Dept: FAMILY MEDICINE | Facility: CLINIC | Age: 58
End: 2018-10-11
Payer: COMMERCIAL

## 2018-10-11 VITALS — SYSTOLIC BLOOD PRESSURE: 140 MMHG | TEMPERATURE: 98.5 F | DIASTOLIC BLOOD PRESSURE: 77 MMHG

## 2018-10-11 DIAGNOSIS — Z71.84 TRAVEL ADVICE ENCOUNTER: Primary | ICD-10-CM

## 2018-10-11 DIAGNOSIS — Z23 NEED FOR VACCINATION: ICD-10-CM

## 2018-10-11 PROCEDURE — 90734 MENACWYD/MENACWYCRM VACC IM: CPT | Mod: GA | Performed by: NURSE PRACTITIONER

## 2018-10-11 PROCEDURE — 90472 IMMUNIZATION ADMIN EACH ADD: CPT | Mod: GA | Performed by: NURSE PRACTITIONER

## 2018-10-11 PROCEDURE — 90717 YELLOW FEVER VACCINE SUBQ: CPT | Mod: GA | Performed by: NURSE PRACTITIONER

## 2018-10-11 PROCEDURE — 90471 IMMUNIZATION ADMIN: CPT | Mod: GA | Performed by: NURSE PRACTITIONER

## 2018-10-11 PROCEDURE — 90715 TDAP VACCINE 7 YRS/> IM: CPT | Mod: GA | Performed by: NURSE PRACTITIONER

## 2018-10-11 PROCEDURE — 99402 PREV MED CNSL INDIV APPRX 30: CPT | Mod: 25 | Performed by: NURSE PRACTITIONER

## 2018-10-11 RX ORDER — ATOVAQUONE AND PROGUANIL HYDROCHLORIDE 250; 100 MG/1; MG/1
1 TABLET, FILM COATED ORAL DAILY
Qty: 42 TABLET | Refills: 0 | Status: SHIPPED | OUTPATIENT
Start: 2018-10-11 | End: 2021-09-27

## 2018-10-11 RX ORDER — AZITHROMYCIN 500 MG/1
500 TABLET, FILM COATED ORAL DAILY
Qty: 3 TABLET | Refills: 0 | Status: SHIPPED | OUTPATIENT
Start: 2018-10-11 | End: 2018-10-14

## 2018-10-11 NOTE — PROGRESS NOTES
Nurse Note      Itinerary:  [facility]      Departure Date: 11/01/2018      Return Date: 12/02/2018      Length of Trip 1 month      Reason for Travel: Visiting friends and relatives           Urban or rural: Urban      Accommodations: Family home        IMMUNIZATION HISTORY  Have you received any immunizations within the past 4 weeks?  No  Have you ever fainted from having your blood drawn or from an injection?  No  Have you ever had a fever reaction to vaccination?  No  Have you ever had any bad reaction or side effect from any vaccination?  No  Have you ever had hepatitis A or B vaccine?  Yes  Do you live (or work closely) with anyone who has AIDS, an AIDS-like condition, any other immune disorder or who is on chemotherapy for cancer?  No  Do you have a family history of immunodeficiency?  No  Have you received any injection of immune globulin or any blood products during the past 12 months?  No    Patient roomed by WON Wilkins Anjali is a 57 year old male seen today with spouse for counsultation for international travel to [facility] for Visiting friends and relatives.  Patient will be departing in  3 week(s) and staying for   1 month(s) and  traveling with alone    Patient itinerary :  will be in the urban region of [facility] which presents risk for Malaria and Yellow Fever. exposure.      Patient's activities will include visiting friends and relatives.    Patient's [place] is [place]    Special medical concerns: none  Pre-travel questionnaire was completed by patient and reviewed by provider.     Vitals: /77  Temp 98.5  F (36.9  C) (Oral)  BMI= There is no height or weight on file to calculate BMI.    EXAM:  General:  Well-nourished, well-developed in no acute distress.  Appears to be stated age, interacts appropriately and expresses understanding of information given to patient.    Current Outpatient Prescriptions   Medication Sig Dispense Refill      atovaquone-proguanil (MALARONE) 250-100 MG per tablet Take 1 tablet by mouth daily Start 2 days before exposure to Malaria and continue daily till  7 days after exposure. 42 tablet 0     azithromycin (ZITHROMAX) 500 MG tablet Take 1 tablet (500 mg) by mouth daily for 3 doses Take 1 tablet a day for up to 3 days for severe diarrhea 3 tablet 0     buPROPion (WELLBUTRIN XL) 150 MG 24 hr tablet Take 1 tablet (150 mg) by mouth every morning 30 tablet 1     Patient Active Problem List   Diagnosis     CARDIOVASCULAR SCREENING; LDL GOAL LESS THAN 160     Low back pain     Frequency of urination     ED (erectile dysfunction)     Sebaceous cyst     Hypoesthesia     Rotator cuff syndrome, left     Low back pain with left-sided sciatica, unspecified back pain laterality, unspecified chronicity     Cervicalgia     No Known Allergies      Immunizations discussed include: works as a CNA [employer]  Hepatitis A:  Declined    Hepatitis B: Declined    Influenza: Declined    Typhoid: Declined  Cost  Rabies: Declined  reviewed managment of a animal bite or scratch (washing wound, seek medical care within 24 hours for post exposure prophylaxis )  Yellow Fever: Stamaril Ordered/given today - consent completed, side effects, precautions, allergies, risks discussed. Patient expressed understanding.  Croatian Encephalitis: Not indicated  Meningococcus: Ordered/given today, risks, benefits and side effects reviewed  Tetanus/Diphtheria: Ordered/given today, risks, benefits and side effects reviewed  Measles/Mumps/Rubella: Up to date per reprot  Cholera: Not needed  Polio: Up to date  Pneumococcal: Under age of 65  Varicella: Immune by disease history per patient report  Zostavax:  Not indicated  Shingrix: deferred  HPV:  Not indicated  TB:  Tested negative recently    Stamaril Informed Consent    The patient was provided with a copy of the IRB-approved consent form and all questions were answered before the patient agreed to participate by  signing the informed consent document.   A copy of the form was provided to the patient.    Date: 10/11/2018  Consent Version Date: 5/10/2017  Consent Obtained by:  Renuka Ambrosio CNP (Lori)     HIPAA:  Yes  HIPAA Authorization Signed Date: October 14, 2018       Inclusion/Exclusion Criteria:    (Similar to Yellow Fever-VAX)      The patient met all of the following inclusion criteria in order to be eligible for the Stamaril vaccination under this EAP (Expanded Access Investigational New Drug Program)           At increased risk for YF, including researchers, laboratory workers, vaccine production staff, and those who are traveling within 30 days to a YF-endemic region or to a country requiring proof of YF vaccination under IHRs (International Health Regulations)?       Yes     Patient is greater than or equal to 9 months of age on the day of vaccination?     Yes     Patient is greater than or equal to 18 years of age and signed and dated the Consent Forms?     Yes     Patient is < 18 years of age and parent(s)/guardian(s) signed and dated the Consent Forms?      Patient is 7 years to < 18 years of age and signed and dated the Assent form?        No Assent is required.  Patient is <7 years of age.     No      No      N/A     The patient did not meet any of the following criteria that would have excluded the patient from receiving the Stamaril vaccination under this EAP              Patient is less than 9 months of age.       No     The patient is breast-feeding and cannot stop nursing for at least 14 days after vaccination.    Note: Yellow Fever vaccine virus may be transmissible via breast milk by nursing mothers who are vaccinated during the final 2 weeks of pregnancy or post-partum.   Following transmission, infants may develop encephalitis.  The minimum time of discontinuation of breastfeeding for 14 days after vaccination is based on the expected clearance of live-attenuated vaccine virus.       No     The  patient is immunosuppressed, whether congenital or idiopathic, including for example, leukemia, lymphoma, other malignancies, and patients who are receiving immunosuppressant medications (e.g. Systemic corticosteroids [greater than the standard dose of topical or inhaled steroids], alkylating drugs, antimetabolites, of other cytotoxic or immunomodulatory drugs) or radiation therapy or organ transplantation.       No     The patient has known hypersensitivity to the active substance or to any of the excipients of Stamaril vaccine or to eggs or chicken proteins.     No     The patient is symptomatic for human immunodeficiency virus (HIV) infection     No     The patient is asymptomatic for HIV infection but accompanied by evidence of severe immune suppression    Note:  Evidence of severe immune suppression includes CD4+ T-cell counts < 200 cubic millimeters (or < 15% total lymphocytes in children aged < 6 years), or as determined by the health care provider.       No     The patient has a history of thymus dysfunction (including myasthenia gravis, thymoma, thymectomy)     No     Moderate or severe febrile illness or acute illness    Note: Participation in the EAP can be reassessed when moderate or severe febrile illness or acute illness has resolved.       No         Altitude Exposure on this trip: no  Past tolerance to Altitude: na    ASSESSMENT/PLAN:    ICD-10-CM    1. Travel advice encounter Z71.89 C YELLOW FEVER IMMUNIZATION, LIVE, SQ (STAMARIL)     MENINGOCOCCAL VACCINE,IM (MENACTRA)     TDAP, IM (10 - 64 YRS) - Adacel     atovaquone-proguanil (MALARONE) 250-100 MG per tablet     azithromycin (ZITHROMAX) 500 MG tablet     ADMIN 1st VACCINE     IMMUNIATION ADMIN EACH ADDT'   2. Need for vaccination Z23 C YELLOW FEVER IMMUNIZATION, LIVE, SQ (STAMARIL)     MENINGOCOCCAL VACCINE,IM (MENACTRA)     TDAP, IM (10 - 64 YRS) - Adacel     ADMIN 1st VACCINE     IMMUNIATION ADMIN EACH ADDT'     I have reviewed general  recommendations for safe travel   including: food/water precautions, insect precautions, safer sex   practices given high prevalence of Zika, HIV and other STDs,   roadway safety. Educational materials and [employer] report provided.    Malaraia prophylaxis recommended: Malarone  Symptomatic treatment for traveler's diarrhea: azithromycin  Altitude illness prevention and treatment: none      Evacuation insurance advised and resources were provided to patient.    Total visit time 30 minutes  with over 50% of time spent counseling patient as detailed above.    Renuka Ambrosio CNP

## 2018-10-11 NOTE — PATIENT INSTRUCTIONS
Today October 11, 2018 you received the    Yellow Fever (YF)    Tetanus (Tdap) Vaccine    Meningococcal (Menactra) Vaccine  .    These appointments can be made as a NURSE ONLY visit.    **It is very important for the vaccinations to be given on the scheduled day(s), this helps ensure you receive the full effectiveness of the vaccine.**    Please call [facility] with any questions [phone]    Thank you for visiting [facility]

## 2018-10-11 NOTE — MR AVS SNAPSHOT
After Visit Summary   10/11/2018    Phani Alba    MRN: [id number]           Patient Information     Date Of Birth          1960        Visit Information        Provider Department      10/11/2018 2:30 PM Renuka Ambrosio APRN CNP [facility]        Today's Diagnoses     Travel advice encounter    -  1    Need for vaccination          Care Instructions    Today October 11, 2018 you received the    Yellow Fever (YF)    Tetanus (Tdap) Vaccine    Meningococcal (Menactra) Vaccine  .    These appointments can be made as a NURSE ONLY visit.    **It is very important for the vaccinations to be given on the scheduled day(s), this helps ensure you receive the full effectiveness of the vaccine.**    Please call [facility] with any questions [phone]    Thank you for visiting [facility]              Follow-ups after your visit        Your next 10 appointments already scheduled     Oct 11, 2018  2:30 PM CDT   Office Visit with AMMON Butler CNP   [facility] ([facility])    [address]  [address] [place] [id number] [phone]           Bring a current list of meds and any records pertaining to this visit. For Physicals, please bring immunization records and any forms needing to be filled out. Please arrive 10 minutes early to complete paperwork.              Who to contact     If you have questions or need follow up information about today's clinic visit or your schedule please contact [facility] directly at [phone].  Normal or non-critical lab and imaging results will be communicated to you by MyChart, letter or phone within 4 business days after the clinic has received the results. If you do not hear from us within 7 days, please contact the clinic through MyChart or phone. If you have a critical or abnormal lab result, we will notify you by phone as soon as  possible.  Submit refill requests through [employer] or call your pharmacy and they will forward the refill request to us. Please allow 3 business days for your refill to be completed.          Additional Information About Your Visit        [employer] Information     [employer] gives you secure access to your electronic health record. If you see a primary care provider, you can also send messages to your care team and make appointments. If you have questions, please call your primary care clinic.  If you do not have a primary care provider, please call [phone] and they will assist you.        Care EveryWhere ID     This is your Care EveryWhere ID. This could be used by other organizations to access your [facility] medical records  [id number]        Your Vitals Were     Temperature                   98.5  F (36.9  C) (Oral)            Blood Pressure from Last 3 Encounters:   10/11/18 140/77   06/21/18 122/82   05/21/18 132/75    Weight from Last 3 Encounters:   06/21/18 184 lb (83.5 kg)   05/21/18 184 lb (83.5 kg)   11/14/17 180 lb (81.6 kg)              We Performed the Following     C YELLOW FEVER IMMUNIZATION, LIVE, SQ (STAMARIL)     MENINGOCOCCAL VACCINE,IM (MENACTRA)     TDAP, IM (10 - 64 YRS) - Adacel          Today's Medication Changes          These changes are accurate as of 10/11/18 10:24 AM.  If you have any questions, ask your nurse or doctor.               Start taking these medicines.        Dose/Directions    atovaquone-proguanil 250-100 MG per tablet   Commonly known as:  MALARONE   Used for:  Travel advice encounter   Started by:  Renuka Ambrosio APRN CNP        Dose:  1 tablet   Take 1 tablet by mouth daily Start 2 days before exposure to Malaria and continue daily till  7 days after exposure.   Quantity:  42 tablet   Refills:  0       azithromycin 500 MG tablet   Commonly known as:  ZITHROMAX   Used for:  Travel advice encounter   Started by:  Renuka Ambrosio APRN CNP        Dose:  500 mg   Take  1 tablet (500 mg) by mouth daily for 3 doses Take 1 tablet a day for up to 3 days for severe diarrhea   Quantity:  3 tablet   Refills:  0            Where to get your medicines      These medications were sent to [id number] - [place], [place] - [address] AT [facility] [address], [facility] [address]     Phone:  [phone]     atovaquone-proguanil 250-100 MG per tablet    azithromycin 500 MG tablet                Primary Care Provider Office Phone # Fax #    Manan Thornton [phone] [address] [facility] [address]        Equal Access to Services     [facility] : Hadii jackie hameedo [place], waaxda luqadaha, qaybta kaalmada adeegyada, taisha han . So [facility] [phone].    ATENCIÓN: Si habla español, tiene a graham disposición servicios gratuitos de asistencia lingüística. [facility] [phone].    We comply with applicable federal civil rights laws and [place] laws. We do not discriminate on the basis of race, color, national origin, age, disability, sex, sexual orientation, or gender identity.            Thank you!     Thank you for choosing [facility]  for your care. Our goal is always to provide you with excellent care. Hearing back from our patients is one way we can continue to improve our services. Please take a few minutes to complete the written survey that you may receive in the mail after your visit with us. Thank you!             Your Updated Medication List - Protect others around you: Learn how to safely use, store and throw away your medicines at www.disposemymeds.org.          This list is accurate as of 10/11/18 10:24 AM.  Always use your most recent med list.                   Brand Name Dispense Instructions for use Diagnosis    atovaquone-proguanil 250-100 MG per tablet    MALARONE    42 tablet    Take 1 tablet by mouth daily Start 2 days before exposure to Malaria and  continue daily till  7 days after exposure.    Travel advice encounter       azithromycin 500 MG tablet    ZITHROMAX    3 tablet    Take 1 tablet (500 mg) by mouth daily for 3 doses Take 1 tablet a day for up to 3 days for severe diarrhea    Travel advice encounter       buPROPion 150 MG 24 hr tablet    WELLBUTRIN XL    30 tablet    Take 1 tablet (150 mg) by mouth every morning    Cervicalgia

## 2020-02-21 ENCOUNTER — OFFICE VISIT (OUTPATIENT)
Dept: URGENT CARE | Facility: URGENT CARE | Age: 60
End: 2020-02-21

## 2020-02-21 VITALS
DIASTOLIC BLOOD PRESSURE: 80 MMHG | WEIGHT: 188 LBS | BODY MASS INDEX: 28.59 KG/M2 | HEART RATE: 69 BPM | SYSTOLIC BLOOD PRESSURE: 132 MMHG | OXYGEN SATURATION: 100 % | TEMPERATURE: 98.1 F | RESPIRATION RATE: 16 BRPM

## 2020-02-21 DIAGNOSIS — S01.111A LACERATION OF RIGHT EYEBROW, INITIAL ENCOUNTER: Primary | ICD-10-CM

## 2020-02-21 PROCEDURE — 12013 RPR F/E/E/N/L/M 2.6-5.0 CM: CPT | Performed by: FAMILY MEDICINE

## 2020-02-21 PROCEDURE — 99207 ZZC NO CHARGE LOS: CPT | Performed by: FAMILY MEDICINE

## 2020-02-21 NOTE — PROGRESS NOTES
SUBJECTIVE:     Chief Complaint   Patient presents with     Laceration     pt fell at work and cut upper R forehead     Phani Alba is a 59 year old male who presents to the clinic with a laceration on the right forehead sustained 2 hour(s) ago.  This is a work related injury.    Mechanism of injury: blunt trauma (contusion).    Associated symptoms: Denies numbness, weakness, or loss of function  Last tetanus booster within 10 years: yes    EXAM:   The patient appears today in alert and in mild distress distress  VITALS: /80   Pulse 69   Temp 98.1  F (36.7  C) (Oral)   Resp 16   Wt 85.3 kg (188 lb)   SpO2 100%   BMI 28.59 kg/m      Size of laceration: 4 centimeters  Characteristics of the laceration: active bleeding and jagged R eyebrown  Neuro cn2-12 intact, oriented x 4  Tendon function intact: yes  Sensation to light touch intact: yes  Pulses intact: not applicable  Picture included in patient's chart: no    Assessment:  Eyebrow lac, R    PLAN:  PROCEDURE NOTE::  Wound was locally injected with 5 cc's of Lidocaine 1% with epinephrine  Prepped and draped in the usual sterile fashion  Wound cleaned with betadine/saline solution  Laceration was closed using 5 5-0 nylon interrupted sutures  After care instructions:  Keep wound clean and dry for the next 24-48 hours  Sutures out in 7 days

## 2020-02-28 ENCOUNTER — OFFICE VISIT (OUTPATIENT)
Dept: URGENT CARE | Facility: URGENT CARE | Age: 60
End: 2020-02-28

## 2020-02-28 DIAGNOSIS — Z48.02 VISIT FOR SUTURE REMOVAL: Primary | ICD-10-CM

## 2020-02-28 PROCEDURE — 99207 ZZC NO CHARGE NURSE ONLY: CPT | Performed by: PHYSICIAN ASSISTANT

## 2020-02-28 NOTE — PROGRESS NOTES
Phani Alba presents to the clinic for removal of sutures. The patient has had sutures in place for 7 days. There has been no patient reported signs or symptoms of infection or drainage. 5  sutures are seen and located on the right eyebrow. Tetanus status is up to date. All sutures were easily removed today. Routine wound care discussed by the RN or provider. The patient will follow up as needed.     Iker Madrigal PA-C on 2/28/2020 at 4:10 PM

## 2020-03-02 ENCOUNTER — HEALTH MAINTENANCE LETTER (OUTPATIENT)
Age: 60
End: 2020-03-02

## 2021-09-27 ENCOUNTER — OFFICE VISIT (OUTPATIENT)
Dept: INTERNAL MEDICINE | Facility: CLINIC | Age: 61
End: 2021-09-27
Payer: COMMERCIAL

## 2021-09-27 VITALS
BODY MASS INDEX: 27.98 KG/M2 | DIASTOLIC BLOOD PRESSURE: 76 MMHG | HEART RATE: 74 BPM | WEIGHT: 184 LBS | TEMPERATURE: 97.9 F | SYSTOLIC BLOOD PRESSURE: 126 MMHG | OXYGEN SATURATION: 99 %

## 2021-09-27 DIAGNOSIS — R10.31 RLQ ABDOMINAL PAIN: Primary | ICD-10-CM

## 2021-09-27 PROCEDURE — 99214 OFFICE O/P EST MOD 30 MIN: CPT | Performed by: INTERNAL MEDICINE

## 2021-09-27 NOTE — PATIENT INSTRUCTIONS
- Someone will call you to schedule an ultrasound. If that's negative, consider meeting with surgeon to discuss possible hernia.

## 2021-09-27 NOTE — PROGRESS NOTES
Assessment & Plan     RLQ abdominal pain  Unclear etiology. No other symptoms. No provoking or alleviating factors. Colonoscopy 4 years ago largely normal. I do believe I felt a small inguinal hernia on that side which could be involved in this pain. Discussed possibility of adhesions from past appendectomy vs hernia. Either would be best evaluated by a surgeon. He preferred to pursue an ultrasound first before involving a surgeon which I did order. If that is normal, would then recommend surgical consult. In the interim, he'll monitor for any other symptoms that may help with this diagnosis.  - US Abdomen Complete; Future    Return in about 6 months (around 3/27/2022) for Physical Exam.    Janes Coleman MD  [facility]HAELAbra[facility] West CampusDARYA Jeronimo is a 60 year old who presents for the following health issues:    Musculoskeletal problem/pain  Onset/Duration: 1 month   Description  Location: R side low abdomen radiating into the side  Joint Swelling: no  Redness: no  Pain: YES  Warmth: no  Intensity:  moderate  Progression of Symptoms:  intermittent  Accompanying signs and symptoms:   Fevers: no  Numbness/tingling/weakness: no  History  Trauma to the area: no  Recent illness:  no  Previous similar problem: no  Previous evaluation:  no  Precipitating or alleviating factors:  Aggravating factors include: bending over  Therapies tried and outcome: tylenol     Has been ongoing for months. Reports history of appendectomy ~20 years ago. He was wondering if this pain was due to his belt being too tight over his appendectomy scar but he stopped wearing the belt and it didn't help. Pain comes on for 5 seconds then goes away. Happens 1-2x per day. No aggravating or alleviating factors. No blood in urine or stool. Reports normal BM.    Review of Systems   Constitutional, gi, MSK systems are negative, except as otherwise noted.      Objective    /76   Pulse 74   Temp 97.9  F (36.6  C)  (Tympanic)   Wt 83.5 kg (184 lb)   SpO2 99%   BMI 27.98 kg/m    Body mass index is 27.98 kg/m .     Physical Exam   GENERAL: alert and in no distress.  EYES: conjunctivae/corneas clear. EOMs grossly intact  HENT: NC/AT, facies symmetric.  RESP: No iWOB.  GI: NT, ND, no rebound or guarding. Appendectomy scar present, well-healed.  : Penis grossly WNL. Small inducible bulge at top of R inguinal canal. No inducible bulge felt at top of L inguinal canal.  MSK: Moves all four extremities freely  SKIN: No significant ulcers, lesions, or rashes on the visualized portions of the skin  NEURO: Alert. Oriented.

## 2021-09-30 ENCOUNTER — HOSPITAL ENCOUNTER (OUTPATIENT)
Dept: ULTRASOUND IMAGING | Facility: CLINIC | Age: 61
Discharge: HOME | End: 2021-09-30
Attending: INTERNAL MEDICINE | Admitting: INTERNAL MEDICINE
Payer: COMMERCIAL

## 2021-09-30 DIAGNOSIS — R10.31 RLQ ABDOMINAL PAIN: ICD-10-CM

## 2021-09-30 PROCEDURE — 76705 ECHO EXAM OF ABDOMEN: CPT

## 2021-10-01 DIAGNOSIS — R10.31 RLQ ABDOMINAL PAIN: Primary | ICD-10-CM

## 2021-10-06 NOTE — PROGRESS NOTES
[facility] Consultation    CHIEF COMPLAINT:  Chief Complaint   Patient presents with     Consult     Right lower quad pain       HISTORY OF PRESENT ILLNESS:  Phani Alba is a 60 year old male who is seen in consultation at the request of Dr. Ramirez for evaluation of right lower quadrant pain.  The patient reports that he first noticed discomfort in the right lower abdomen starting approximately 2 months ago.  Patient denies pain with palpation in the area of his pain.  The pain is intermittent but does occur on a daily basis.  His symptoms are worse with increased activity.  He has not noticed any associated bulging.  His pain is inferior to his open appendectomy incision.  Patient denies history of previous hernias.  No other previous abdominal surgical procedures.  No significant family history of hernias.  The patient did undergo a right lower quadrant ultrasound which did not demonstrate any evidence of hernia at the appendectomy site.  However, the patient's inguinal area was not surveyed.    REVIEW OF SYSTEMS:  Constitutional: No fevers or chills  Eyes: Blurred vision  HENT: Denies headaches, No rhinorrhea, No sore throat  Respiratory: No cough or shortness of breath  Cardiovascular: Denies chest pain or palpitations  Gastrointestinal: Abdominal pain  Genitourinary: No hematuria or dysuria  Musculoskeletal: Denies arthralgias or myalgias  Neurologic: Numbness/tingling in hands and feet  Integumentary: Facial rash    Past Medical History:   Diagnosis Date     ED (erectile dysfunction) 12/11/2014     Hypoesthesia 5/21/2018     Hypoesthesia 5/21/2018     Keloid of skin      Low back pain with left-sided sciatica, unspecified back pain laterality, unspecified chronicity 5/21/2018     Rotator cuff syndrome, left 5/21/2018     Sebaceous cyst 12/11/2014     Tubular adenoma of colon        Past Surgical History:   Procedure Laterality Date     APPENDECTOMY         Family History   Problem  "Relation Age of Onset     Family History Negative No family hx of        Social History     Tobacco Use     Smoking status: Never Smoker     Smokeless tobacco: Never Used   Substance Use Topics     Alcohol use: Yes     Comment: occ       Patient Active Problem List   Diagnosis     CARDIOVASCULAR SCREENING; LDL GOAL LESS THAN 160     Low back pain     Frequency of urination     ED (erectile dysfunction)     Sebaceous cyst     Hypoesthesia     Rotator cuff syndrome, left     Low back pain with left-sided sciatica, unspecified back pain laterality, unspecified chronicity     Cervicalgia       No Known Allergies    No current outpatient medications on file.       Vitals: /70 (BP Location: Left arm, Patient Position: Sitting, Cuff Size: Adult Regular)   Pulse 68   Ht 1.727 m (5' 8\")   Wt 83.9 kg (185 lb)   SpO2 98%   BMI 28.13 kg/m    BMI= Body mass index is 28.13 kg/m .    EXAM:  General: Vital signs reviewed, in no apparent distress  Eyes: Anicteric  HENT: Normocephalic, atraumatic, trachea midline   Respiratory: Breathing nonlabored  Cardiovascular: Regular rate and rhythm  GI: Abdomen soft, nondistended, nontender; pain with palpation over mid to lateral aspect of right inguinal canal with possible small reducible inguinal hernia  Musculoskeletal: No gross deformities  Neurologic: Grossly nonfocal exam  Psychiatric: Normal mood, affect and insight  Integumentary: Warm and dry    All labs and imaging personally reviewed and significant for:   ULTRASOUND ABDOMEN LIMITED  9/30/2021 9:51 AM                                                                   IMPRESSION: Limited ultrasound of the right lower abdomen over the  appendectomy incision demonstrates no evidence for hernia with or  without Valsalva. No obvious fluid collection is seen in the tissues  just underneath the incision.    ASSESSMENT:  Phani Alba is a 60 year old who presents with right inguinal discomfort, possible hernia.  Significant " pertinent co-morbidities include: None.       PLAN:  At this time, I recommend proceeding with ultrasound evaluation of the patient's right inguinal area to further evaluate for right inguinal hernia.  I am suspicious that this is what is causing the patient's right inguinal discomfort.  I did briefly discuss inguinal hernia repair and its risks and benefits with the patient today in clinic.  I will plan to contact the patient directly when the results of the ultrasound evaluation are available.  Patient is in agreement with this plan.    It was my pleasure to participate in the care of Phani Alba in clinic today. Thank you for this consultation.         Courtney Lopez MD    Please route or send letter to:  Primary Care Provider (PCP) and Referring Provider

## 2021-10-08 ENCOUNTER — OFFICE VISIT (OUTPATIENT)
Dept: SURGERY | Facility: CLINIC | Age: 61
End: 2021-10-08
Payer: COMMERCIAL

## 2021-10-08 VITALS
OXYGEN SATURATION: 98 % | HEART RATE: 68 BPM | SYSTOLIC BLOOD PRESSURE: 108 MMHG | HEIGHT: 68 IN | WEIGHT: 185 LBS | BODY MASS INDEX: 28.04 KG/M2 | DIASTOLIC BLOOD PRESSURE: 70 MMHG

## 2021-10-08 DIAGNOSIS — R10.31 RIGHT INGUINAL PAIN: Primary | ICD-10-CM

## 2021-10-08 PROCEDURE — 99214 OFFICE O/P EST MOD 30 MIN: CPT | Performed by: SURGERY

## 2021-10-08 ASSESSMENT — MIFFLIN-ST. JEOR: SCORE: 1633.65

## 2021-10-08 NOTE — LETTER
October 8, 2021          Janes Ramirez MD  [address] [place] [address]      RE:   Phani Alba 1960      Dear Colleague,    Thank you for referring your patient, Phani Alba, to [facility] [place] Please see a copy of my visit note below.    [facility] Surgery Clinic Consultation          Chief Complaint   Patient presents for Consult of Right Lower Quadrant Pain         HISTORY OF PRESENT ILLNESS:  Phani Alba is a 60 year old male who is seen in consultation at the request of Dr. Ramirez for evaluation of right lower quadrant pain.  The patient reports that he first noticed discomfort in the right lower abdomen starting approximately 2 months ago.  Patient denies pain with palpation in the area of his pain.  The pain is intermittent but does occur on a daily basis.  His symptoms are worse with increased activity.  He has not noticed any associated bulging.  His pain is inferior to his open appendectomy incision.  Patient denies history of previous hernias.  No other previous abdominal surgical procedures.  No significant family history of hernias.  The patient did undergo a right lower quadrant ultrasound which did not demonstrate any evidence of hernia at the appendectomy site.  However, the patient's inguinal area was not surveyed.     All labs and imaging personally reviewed and significant for:   ULTRASOUND ABDOMEN LIMITED  9/30/2021 9:51 AM                                                                   IMPRESSION: Limited ultrasound of the right lower abdomen over the  appendectomy incision demonstrates no evidence for hernia with or  without Valsalva. No obvious fluid collection is seen in the tissues  just underneath the incision.     ASSESSMENT:  Phani Alba is a 60 year old who presents with right inguinal discomfort, possible hernia.  Significant pertinent co-morbidities include: None.         PLAN:  At this time, I recommend  proceeding with ultrasound evaluation of the patient's right inguinal area to further evaluate for right inguinal hernia.  I am suspicious that this is what is causing the patient's right inguinal discomfort.  I did briefly discuss inguinal hernia repair and its risks and benefits with the patient today in clinic.  I will plan to contact the patient directly when the results of the ultrasound evaluation are available.  Patient is in agreement with this plan.        Again, thank you for allowing me to participate in the care of your patient.      Sincerely,      Courtney Lopez MD

## 2021-10-14 ENCOUNTER — HOSPITAL ENCOUNTER (OUTPATIENT)
Dept: ULTRASOUND IMAGING | Facility: CLINIC | Age: 61
Discharge: HOME | End: 2021-10-14
Attending: SURGERY | Admitting: SURGERY
Payer: COMMERCIAL

## 2021-10-14 DIAGNOSIS — R10.31 RIGHT INGUINAL PAIN: ICD-10-CM

## 2021-10-14 PROCEDURE — 76705 ECHO EXAM OF ABDOMEN: CPT

## 2021-10-15 DIAGNOSIS — R10.31 INGUINAL PAIN, RIGHT: Primary | ICD-10-CM

## 2021-10-15 NOTE — PROGRESS NOTES
Surgery:    Patient contacted and results of recent ultrasound exam discussed- no evidence of right inguinal hernia.  Due to the fact that the patient is having ongoing discomfort in the right inguinal area, I have offered him a pain clinic referral.  He is interested in pursuing this.  The patient will be contacted directly to schedule.  I instructed the patient to continue to monitor his symptoms in the right inguinal area.  If he is noticing any bulging associated with his pain, I encouraged him to return to our clinic for repeat evaluation.    US HERNIA EVALUATION 10/14/2021 1:09 PM     HISTORY: Discomfort with palpation along right inguinal canal,  evaluate for small hernia. Right inguinal pain.     COMPARISON: Abdominal ultrasound on 9/30/2021.     TECHNIQUE: Routine.     FINDINGS: Focused soft tissue ultrasound of the right groin  demonstrates no evidence of hernia.                                                                      IMPRESSION: No evidence of right groin hernia.     Courtney Lopez MD

## 2021-10-18 ENCOUNTER — TELEPHONE (OUTPATIENT)
Dept: PALLIATIVE MEDICINE | Facility: CLINIC | Age: 61
End: 2021-10-18

## 2021-10-18 NOTE — TELEPHONE ENCOUNTER

## 2021-11-11 ENCOUNTER — OFFICE VISIT (OUTPATIENT)
Dept: PALLIATIVE MEDICINE | Facility: CLINIC | Age: 61
End: 2021-11-11
Attending: SURGERY
Payer: COMMERCIAL

## 2021-11-11 VITALS — OXYGEN SATURATION: 98 % | SYSTOLIC BLOOD PRESSURE: 137 MMHG | HEART RATE: 73 BPM | DIASTOLIC BLOOD PRESSURE: 83 MMHG

## 2021-11-11 DIAGNOSIS — R10.31 INGUINAL PAIN, RIGHT: ICD-10-CM

## 2021-11-11 DIAGNOSIS — G57.80 ILIOHYPOGASTRIC NERVE NEURALGIA: Primary | ICD-10-CM

## 2021-11-11 PROCEDURE — 99203 OFFICE O/P NEW LOW 30 MIN: CPT | Performed by: PHYSICAL MEDICINE & REHABILITATION

## 2021-11-11 RX ORDER — MULTIPLE VITAMINS W/ MINERALS TAB 9MG-400MCG
1 TAB ORAL DAILY
COMMUNITY

## 2021-11-11 RX ORDER — ACETAMINOPHEN 500 MG
500-1000 TABLET ORAL EVERY 6 HOURS PRN
COMMUNITY

## 2021-11-11 RX ORDER — MULTIVIT-MIN/IRON/FOLIC ACID/K 18-600-40
CAPSULE ORAL
COMMUNITY

## 2021-11-11 ASSESSMENT — PAIN SCALES - GENERAL: PAINLEVEL: SEVERE PAIN (6)

## 2021-11-11 NOTE — PROGRESS NOTES
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 Right lower quadrant abdominal pain: Patient reports a 3 month history of sharp, stabbing, intermittent pain in the right lower quadrant. They had an emergent appendecomty in this region over 20 years ago. US recently ruled out any hernia in this region. On exam they have mild tenderness with palpation in this area. Otherwise abdominal, hip and lower extremity neurological exam was wnl, carnet's negative. Differential includes: Iliohypogastric neuralgia, myofascial pain, other GI pain sources.      Plan:  The following recommendations were given to the patient. Diagnosis, treatment options, risks, benefits, and alternatives were discussed, and all questions were answered. The patient expressed understanding of the plan for management.     I am recommending a multidisciplinary treatment plan to help this patient better manage his pain.  This includes:     1. Physical Therapy: Will discuss treatments with PT team and inform patient at follow up.  2. Clinical Health Pain Psychologist: coping well, defer.   3. Self Care Recommendations: Gentle progressive exercise that does not increase pain - gradually increase daily walking program.  Take mini breaks - 5 minutes of mindfullness a couple times a day.   4. Diagnostic Studies: none  5. Medication Management:   1. Continue tylenol prn  1. No medication changes today.  6. Further procedures recommended: iliohypogastric nerve block ordered.  7. Follow up: 1 month after procedure.      DO [facility]          Reason for consultation:    Phani Alba is a 60 year old male who is seen in consultation today at the  "request of Courtney Lopez MD.     Consultation and Evaluation for: abdominal pain    Review of [place] Prescription Monitoring Program (): Today I have also reviewed the patient's history of controlled substance use, as provided by [place] licensed pharmacies and prescriber dispensers.     Review of Pain Questionnaire: Please see the [facility] [place] health questionnaire, which the patient completed and reviewed with me in detail.    Review of Electronic Chart: Today I have also reviewed available medical information in the patient's medical record at [place] ([facility] including relevant provider notes, laboratory work, and imaging.     Phani Alba has not been seen at a pain clinic in the past.      Chief Complaint:    No chief complaint on file.      Pain history:  Phani Alba is a 60 year old male who presents for initial evaluation of chief pain complaint of abdominal pain.     Phani began having sharp intermittent pain in the right lower quadrant about 2-3 months ago. 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 He has been valuated for a hernia, ultrasound was negative. He had an appendectomy 20 years ago and the pain is in the same region, this is worrisome.    When the pain occurs it can last for about 5 seconds. Sometimes it goes away right away, other times it can recur several times before it stops. They deny any numbness or tingling.    Onset: 3 months  Location/Radiation: RLQ  Quality: sharp, constant  Severity/Intensity: 8/10 at worst, 4/10 at best, 6/10 on average  Aggravating factors include: \"bending down\"  Relieving factors include: \"stretching out\"  Red Flags: The patient denies bowel or bladder incontinence, parasthesias, weakness, saddle anesthesia, unintentional weight loss, or fever/chills/sweats.         Pain Treatments:  1. Medications:       Current pain medications:  -Tylenol prn       Previous pain " medications:  -None  2. Physical Therapy: hasn't tried     TENS unit: hasn't tried  3. Pain psychology: hasn't tried  4. Surgery: appendectomy 20+ years ago  5. Injections: none  6. Alternative Therapies:    Chiropractic: hasn't tried    Acupuncture: hasn't tried      Diagnostic tests: ultrasound neg for hernia.          Past Medical History:  Past Medical History:   Diagnosis Date     ED (erectile dysfunction) 12/11/2014     Hypoesthesia 5/21/2018     Hypoesthesia 5/21/2018     Keloid of skin      Low back pain with left-sided sciatica, unspecified back pain laterality, unspecified chronicity 5/21/2018     Rotator cuff syndrome, left 5/21/2018     Sebaceous cyst 12/11/2014     Tubular adenoma of colon        Past Surgical History:  Past Surgical History:   Procedure Laterality Date     APPENDECTOMY         Medications:  No current outpatient medications on file.       Allergies:   No Known Allergies    Social History:  Home situation: lives in [place] with family  Occupation/Schooling: CNA at [employer]  Tobacco use: denies  Drug use: denies  Alcohol use: denies  History of chemical dependency treatment: denies  Mental health admissions: denies    Family history:  Family History   Problem Relation Age of Onset     Family History Negative No family hx of        Review of Systems:    POSTIVE IN BOLD  GENERAL: fever/chills, fatigue, general unwell feeling, weight gain/loss.  HEAD/EYES:  headache, dizziness, or vision changes.    EARS/NOSE/THROAT:  Nosebleeds, hearing loss, sinus infection, earache, tinnitus.  IMMUNE:  Allergies, cancer, immune deficiency, or infections.  SKIN:  Urticaria, rash, hives  HEME/Lymphatic:   anemia, easy bruising, easy bleeding.  RESPIRATORY:  cough, wheezing, or shortness of breath  CARDIOVASCULAR/Circulation:  Extremity edema, syncope, hypertension, tachycardia, or angina.  GASTROINTESTINAL:  abdominal pain, nausea/emesis, diarrhea, constipation,  hematochezia, or melena.  ENDOCRINE:   Diabetes, steroid use,  thyroid disease or osteoporosis.  MUSCULOSKELETAL: neck pain, back pain, arthralgia, arthritis, or gout.  GENITOURINARY:  frequency, urgency, dysuria, difficulty voiding, hematuria or incontinence.  NEUROLOGIC:  weakness, numbness, paresthesias, seizure, tremor, stroke or memory loss.  PSYCHIATRIC:  depression, anxiety, stress, suicidal thoughts or mood swings.     Physical Exam:  Vitals:    11/11/21 1435   BP: 137/83   Pulse: 73   SpO2: 98%     Exam:  Constitutional: Well developed, well nourished, appears stated age.  HEENT: Head atraumatic, normocephalic. Eyes without conjunctival injection or jaundice. Oropharynx clear.   Skin: No rash, lesions, or petechiae of exposed skin.   Extremities: Peripheral pulses intact. No clubbing, cyanosis, or edema.  Psychiatric/mental status: Alert, without lethargy or stupor. Speech fluent. Appropriate affect. Mood normal. Able to follow commands without difficulty.     Musculoskeletal exam:  Gait/Station/Posture:  Normal stance, arm swing, and stride; no antalgia or Trendelenburg  Normal bulk and tone.     Abd exam:  BS+ in all 4 quadrants. Mild tenderness with deep palpation in the RLQ. Otherwise no tenderness noted. No rebound or guarding. Carnet's negative. Sensation is normal to light touch. Well healed appendectomy incision in the RLQ.     Lumbar spine:  Range of motion within normal limits      Hip exam:   normal internal and external range of motion bilaterally. ALESHA negative. Scour negative.         Neurologic exam:  CN:  Cranial nerves 2-12 are grossly intact  Motor Strength:  5/5 symmetric UE and LE strength        Reflexes:       Patella L4:  R:  2/4 L: 2/4   Achilles S1:  R:  1/4 L: 1/4        Sensory:  (upper and lower extremities):   Light touch: normal     BILLING TIME DOCUMENTATION:   The total TIME spent on this patient on the date of the encounter/appointment was 31 minutes.      TOTAL TIME includes:   Time spent preparing to see the  patient (reviewing records and tests)   Time spent face to face (or over the phone) with the patient   Time spent ordering tests, medications, procedures and referrals  Time spent Referring and communicating with other healthcare professionals  Time spent documenting clinical information in Epic     DO [facility]

## 2021-11-11 NOTE — PATIENT INSTRUCTIONS
1. I ordered a iliohypogastric nerve block, you'll be called to schedule.    Take care,    Ruben Butt DO  [facility]        ----------------------------------------------------------------  Clinic Number:  [phone]     Call with any questions about your care and for scheduling assistance.     Calls are returned Monday through Friday between 8 AM and 4:30 PM. We usually get back to you within 2 business days depending on the issue/request.    If we are prescribing your medications:    For opioid medication refills, call the clinic or send a [facility] message 7 days in advance.  Please include:    Name of requested medication    Name of the pharmacy.    For non-opioid medications, call your pharmacy directly to request a refill. Please allow 3-4 days to be processed.     Per [place] State Law:    All controlled substance prescriptions must be filled within 30 days of being written.      For those controlled substances allowing refills, pickup must occur within 30 days of last fill.      We believe regular attendance is key to your success in our program!      Any time you are unable to keep your appointment we ask that you call us at least 24 hours in advance to cancel.This will allow us to offer the appointment time to another patient.     Multiple missed appointments may lead to dismissal from the clinic.

## 2021-11-15 NOTE — PROGRESS NOTES
"[facility] - Procedure Note    Date of Visit: 11/15/2021    Procedure performed: Right iliohypogastric nerve block  Diagnosis: Iliohypogastric neuralgia/right lower abdominal pain  : Ruben Butt DO  Anesthesia: none     Indications:Phani Alba is a 60 year old male who is seen for an iliohypogastric nerve block. The patient describes pain in his right lower abdomen. The patient has been exhibiting symptoms consistent with iliohypogastric nerualgia.  Symptoms have been persistent, disabling, and intermittently severe. The patient reports minimal improvement with conservative treatment, including oral medications and stretching      Allergies:    No Known Allergies     Vitals:  /79   Pulse 63   SpO2 99%     Review of Systems: The patient denies recent fever, chills, illness, use of antibiotics or anticoagulants. All other 10-point review of systems negative.     The procedure and risks were explained, and informed written consent was obtained from the patient. Risks include but are not limited to: infection, bleeding, increased pain, and damage to soft tissue, nerve, muscle, and vasculature structures. The procedure site was marked with a disposable pen. Immediately prior to the start of the procedure, a \"time out\" safety check was conducted to confirm correct patient, procedure, and laterality.   After the right iliohypogastric region was prepped in a sterile manner, an area 2cm superior and 2cm medial to the anterior superior iliac spine was identified.  The area was viewed under ultrasound guidance to identify the muscle layers.  The skin was anesthetized with 1ml of lidocaine 1%.  A 10cm stimuplex ultrasound needle was advanced under ultrasound guidance through to the fascial plane between the internal oblique and transverse abdominis  Ultrasound guidance was used to determine the location of the needle.   Then, a total of 8ml of a mixture of 7 ml of " Bupivicaine 0.25% and kenalog 40 mg was injected, in divided doses as the needle was moved in this region under ultrasound guidance.  The needle was removed.The patient tolerated the procedure well, and there was no evidence of procedural complications. The patient denied significant pain, sensory changes, or weakness during or after needle placement. The patient was stable post-procedure and was able to ambulate on discharge home. Post-procedure instructions were provided.     Pre-procedure pain score: 4/10  Post-procedure pain score: 4/10      Assessment/Plan:Phani Alba is a 60 year old male  s/p Right iliohypogastric nerve block for right lower abdominal pain.     1. Following today's procedure, the patient was advised to contact the [facility] for any of the following:   Fever, chills, or night sweats   New onset of pain, numbness, or weakness   Any questions/concerns regarding the procedure  If unable to contact the Pain Center, the patient was instructed to go to a local Emergency Room for any complications.   2. Follow-up with the referring provider in 6-8 weeks for post-procedure evaluation.        Ruben Butt DO  [facility] [place]

## 2021-11-16 ENCOUNTER — OFFICE VISIT (OUTPATIENT)
Dept: PALLIATIVE MEDICINE | Facility: CLINIC | Age: 61
End: 2021-11-16
Attending: PHYSICAL MEDICINE & REHABILITATION
Payer: COMMERCIAL

## 2021-11-16 VITALS — SYSTOLIC BLOOD PRESSURE: 130 MMHG | OXYGEN SATURATION: 99 % | DIASTOLIC BLOOD PRESSURE: 79 MMHG | HEART RATE: 63 BPM

## 2021-11-16 DIAGNOSIS — R10.31 INGUINAL PAIN, RIGHT: ICD-10-CM

## 2021-11-16 DIAGNOSIS — G57.80 ILIOHYPOGASTRIC NERVE NEURALGIA: Primary | ICD-10-CM

## 2021-11-16 PROCEDURE — 76942 ECHO GUIDE FOR BIOPSY: CPT | Performed by: PHYSICAL MEDICINE & REHABILITATION

## 2021-11-16 PROCEDURE — 64425 NJX AA&/STRD II IH NERVES: CPT | Mod: RT | Performed by: PHYSICAL MEDICINE & REHABILITATION

## 2021-11-16 RX ORDER — TRIAMCINOLONE ACETONIDE 40 MG/ML
40 INJECTION, SUSPENSION INTRA-ARTICULAR; INTRAMUSCULAR ONCE
Status: COMPLETED | OUTPATIENT
Start: 2021-11-16 | End: 2021-11-16

## 2021-11-16 RX ORDER — BUPIVACAINE HYDROCHLORIDE 2.5 MG/ML
7 INJECTION, SOLUTION EPIDURAL; INFILTRATION; INTRACAUDAL ONCE
Status: COMPLETED | OUTPATIENT
Start: 2021-11-16 | End: 2021-11-16

## 2021-11-16 RX ADMIN — TRIAMCINOLONE ACETONIDE 40 MG: 40 INJECTION, SUSPENSION INTRA-ARTICULAR; INTRAMUSCULAR at 08:42

## 2021-11-16 RX ADMIN — BUPIVACAINE HYDROCHLORIDE 17.5 MG: 2.5 INJECTION, SOLUTION EPIDURAL; INFILTRATION; INTRACAUDAL at 08:40

## 2021-11-16 ASSESSMENT — PAIN SCALES - GENERAL: PAINLEVEL: MODERATE PAIN (4)

## 2021-11-16 NOTE — PATIENT INSTRUCTIONS
[facility]  Post Procedure Instructions    Today you had:    right iliohypogastric nerve block    Medications used:  lidocaine   bupivicaine   kenolog            Go to the emergency room if you develop any shortness of breath    Monitor the injection sites for signs and symptoms of infection-fever, chills, redness, swelling, warmth, or drainage to areas.    You may have soreness at injection sites for up to 24 hours.    You may apply ice to the painful areas to help minimize the discomfort of the needle pokes.    Do not apply heat to sites for at least 12 hours.    You may use anti-inflammatory medications or Tylenol for pain control if necessary    Pain Clinic phone number during work hours (Monday through Friday 8 am-4:30 pm) at [phone] or the Provider Line after hours at [phone]:

## 2022-06-09 ENCOUNTER — TELEPHONE (OUTPATIENT)
Dept: PALLIATIVE MEDICINE | Facility: CLINIC | Age: 62
End: 2022-06-09
Payer: COMMERCIAL

## 2022-06-09 NOTE — TELEPHONE ENCOUNTER
Pt seen once 11/2021. No follow up. OK to transfer to Dr Arriaza if needed    Date of Visit: 11/15/2021     Procedure performed: Right iliohypogastric nerve block      Renee ANDERSEN, RN Care Coordinator  [facility]  Pain [facility]

## 2022-06-10 ENCOUNTER — LAB REQUISITION (OUTPATIENT)
Dept: LAB | Facility: CLINIC | Age: 62
End: 2022-06-10

## 2022-06-10 PROCEDURE — U0003 INFECTIOUS AGENT DETECTION BY NUCLEIC ACID (DNA OR RNA); SEVERE ACUTE RESPIRATORY SYNDROME CORONAVIRUS 2 (SARS-COV-2) (CORONAVIRUS DISEASE [COVID-19]), AMPLIFIED PROBE TECHNIQUE, MAKING USE OF HIGH THROUGHPUT TECHNOLOGIES AS DESCRIBED BY CMS-2020-01-R: HCPCS | Performed by: INTERNAL MEDICINE

## 2022-06-11 LAB — SARS-COV-2 RNA RESP QL NAA+PROBE: NEGATIVE

## 2022-10-17 ENCOUNTER — DOCUMENTATION ONLY (OUTPATIENT)
Dept: LAB | Facility: CLINIC | Age: 62
End: 2022-10-17

## 2022-10-17 DIAGNOSIS — Z00.00 ENCOUNTER FOR PREVENTIVE CARE: Primary | ICD-10-CM

## 2022-10-17 NOTE — PROGRESS NOTES
I have not seen him for 4 years    He should make appt to establish with a provider at tomorrow's visit    Labs futured    Manan Thornton MD

## 2022-10-17 NOTE — PROGRESS NOTES
Please place or confirm orders for upcoming lab appointment on 10/17/2022. Thank you.    Looks like patient is coming in for the following below:    Scheduling Notes: tb blood text   Made On: 10/17/2022 11:07 AM By: REINA SANDS     Please place orders if needed otherwise have Care Team provider call and inform patient and cancel lab appointment. Thank you.

## 2022-10-19 ENCOUNTER — OFFICE VISIT (OUTPATIENT)
Dept: INTERNAL MEDICINE | Facility: CLINIC | Age: 62
End: 2022-10-19
Payer: COMMERCIAL

## 2022-10-19 VITALS
BODY MASS INDEX: 27.63 KG/M2 | DIASTOLIC BLOOD PRESSURE: 64 MMHG | SYSTOLIC BLOOD PRESSURE: 132 MMHG | WEIGHT: 181.7 LBS | HEART RATE: 83 BPM | RESPIRATION RATE: 14 BRPM | OXYGEN SATURATION: 96 %

## 2022-10-19 DIAGNOSIS — Z11.1 SCREENING EXAMINATION FOR PULMONARY TUBERCULOSIS: Primary | ICD-10-CM

## 2022-10-19 DIAGNOSIS — Z23 HIGH PRIORITY FOR 2019-NCOV VACCINE: ICD-10-CM

## 2022-10-19 PROCEDURE — 36415 COLL VENOUS BLD VENIPUNCTURE: CPT | Performed by: INTERNAL MEDICINE

## 2022-10-19 PROCEDURE — 99213 OFFICE O/P EST LOW 20 MIN: CPT | Mod: 25 | Performed by: INTERNAL MEDICINE

## 2022-10-19 PROCEDURE — 0124A COVID-19,PF,PFIZER BOOSTER BIVALENT: CPT | Performed by: INTERNAL MEDICINE

## 2022-10-19 PROCEDURE — 91312 COVID-19,PF,PFIZER BOOSTER BIVALENT: CPT | Performed by: INTERNAL MEDICINE

## 2022-10-19 PROCEDURE — 86481 TB AG RESPONSE T-CELL SUSP: CPT | Performed by: INTERNAL MEDICINE

## 2022-10-19 NOTE — PROGRESS NOTES
Assessment & Plan     Screening examination for pulmonary tuberculosis    - Quantiferon TB Gold Plus; Future    High priority for 2019-nCoV vaccine    - COVID-19,PF,PFIZER BOOSTER BIVALENT 12+Yrs           Return in about 2 weeks (around 11/2/2022) for recheck, if symptoms fail to improve.    Isidoro Marrero MD  [facility] JACEK Jeronimo is a 61 year old, presenting for the following health issues:  RECHECK and Imm/Inj (COVID-19 VACCINE)      History of Present Illness       Reason for visit:  TB blood test    He eats 0-1 servings of fruits and vegetables daily.He consumes 1 sweetened beverage(s) daily.He exercises with enough effort to increase his heart rate 20 to 29 minutes per day.  He exercises with enough effort to increase his heart rate 5 days per week.   He is taking medications regularly.       As above.  Needs tuberculosis screening test for his employment, and he has previously had false positives with PPD testing.        Review of Systems   Constitutional, HEENT, cardiovascular, pulmonary, gi and gu systems are negative, except as otherwise noted.      Objective    /64 (BP Location: Right arm, Patient Position: Sitting, Cuff Size: Adult Large)   Pulse 83   Resp 14   Wt 82.4 kg (181 lb 11.2 oz)   SpO2 96%   BMI 27.63 kg/m    Body mass index is 27.63 kg/m .  Physical Exam   GENERAL: healthy, alert and no distress  NECK: no adenopathy, no asymmetry, masses, or scars and thyroid normal to palpation  RESP: lungs clear to auscultation - no rales, rhonchi or wheezes  CV: regular rate and rhythm, normal S1 S2, no S3 or S4, no murmur, click or rub, no peripheral edema and peripheral pulses strong  ABDOMEN: soft, nontender, no hepatosplenomegaly, no masses and bowel sounds normal  MS: no gross musculoskeletal defects noted, no edema

## 2022-10-21 LAB
GAMMA INTERFERON BACKGROUND BLD IA-ACNC: 0.16 IU/ML
M TB IFN-G BLD-IMP: NEGATIVE
M TB IFN-G CD4+ BCKGRND COR BLD-ACNC: 9.84 IU/ML
MITOGEN IGNF BCKGRD COR BLD-ACNC: -0.03 IU/ML
MITOGEN IGNF BCKGRD COR BLD-ACNC: 0 IU/ML
QUANTIFERON MITOGEN: 10 IU/ML
QUANTIFERON NIL TUBE: 0.16 IU/ML
QUANTIFERON TB1 TUBE: 0.16 IU/ML
QUANTIFERON TB2 TUBE: 0.13

## 2024-05-23 ENCOUNTER — HOSPITAL ENCOUNTER (OUTPATIENT)
Dept: GENERAL RADIOLOGY | Facility: CLINIC | Age: 64
Discharge: HOME | End: 2024-05-23
Attending: INTERNAL MEDICINE

## 2024-05-23 DIAGNOSIS — Z86.15 HISTORY OF LATENT TUBERCULOSIS: ICD-10-CM

## 2024-05-23 PROCEDURE — 999N000028 XR CHEST 1 VIEW, EMPLOYEE HEALTH

## 2024-10-03 ENCOUNTER — TELEPHONE (OUTPATIENT)
Dept: INTERNAL MEDICINE | Facility: CLINIC | Age: 64
End: 2024-10-03

## 2024-10-03 ENCOUNTER — OFFICE VISIT (OUTPATIENT)
Dept: INTERNAL MEDICINE | Facility: CLINIC | Age: 64
End: 2024-10-03
Payer: COMMERCIAL

## 2024-10-03 VITALS
SYSTOLIC BLOOD PRESSURE: 130 MMHG | HEART RATE: 99 BPM | OXYGEN SATURATION: 96 % | WEIGHT: 180.2 LBS | DIASTOLIC BLOOD PRESSURE: 68 MMHG | HEIGHT: 68 IN | BODY MASS INDEX: 27.31 KG/M2 | RESPIRATION RATE: 16 BRPM

## 2024-10-03 DIAGNOSIS — Z23 ENCOUNTER FOR IMMUNIZATION: ICD-10-CM

## 2024-10-03 DIAGNOSIS — E11.65 TYPE 2 DIABETES MELLITUS WITH HYPERGLYCEMIA, WITHOUT LONG-TERM CURRENT USE OF INSULIN (H): ICD-10-CM

## 2024-10-03 DIAGNOSIS — Z13.31 POSITIVE SCREENING FOR DEPRESSION ON 9-ITEM PATIENT HEALTH QUESTIONNAIRE (PHQ-9): ICD-10-CM

## 2024-10-03 DIAGNOSIS — N39.41 URGENCY INCONTINENCE: ICD-10-CM

## 2024-10-03 DIAGNOSIS — Z12.5 SCREENING FOR PROSTATE CANCER: ICD-10-CM

## 2024-10-03 DIAGNOSIS — Z00.00 ROUTINE GENERAL MEDICAL EXAMINATION AT A HEALTH CARE FACILITY: Primary | ICD-10-CM

## 2024-10-03 DIAGNOSIS — E78.5 HYPERLIPIDEMIA LDL GOAL <100: ICD-10-CM

## 2024-10-03 DIAGNOSIS — R73.9 HYPERGLYCEMIA: ICD-10-CM

## 2024-10-03 DIAGNOSIS — Z12.11 SCREEN FOR COLON CANCER: ICD-10-CM

## 2024-10-03 DIAGNOSIS — R26.89 IMBALANCE: ICD-10-CM

## 2024-10-03 LAB
ALBUMIN SERPL BCG-MCNC: 4.3 G/DL (ref 3.5–5.2)
ALBUMIN UR-MCNC: NEGATIVE MG/DL
ALP SERPL-CCNC: 47 U/L (ref 40–150)
ALT SERPL W P-5'-P-CCNC: 28 U/L (ref 0–70)
ANION GAP SERPL CALCULATED.3IONS-SCNC: 12 MMOL/L (ref 7–15)
APPEARANCE UR: CLEAR
AST SERPL W P-5'-P-CCNC: 22 U/L (ref 0–45)
BASOPHILS # BLD AUTO: 0 10E3/UL (ref 0–0.2)
BASOPHILS NFR BLD AUTO: 0 %
BILIRUB SERPL-MCNC: 0.5 MG/DL
BILIRUB UR QL STRIP: NEGATIVE
BUN SERPL-MCNC: 12 MG/DL (ref 8–23)
CALCIUM SERPL-MCNC: 9.5 MG/DL (ref 8.8–10.4)
CHLORIDE SERPL-SCNC: 96 MMOL/L (ref 98–107)
CHOLEST SERPL-MCNC: 217 MG/DL
COLOR UR AUTO: YELLOW
CREAT SERPL-MCNC: 0.95 MG/DL (ref 0.67–1.17)
EGFRCR SERPLBLD CKD-EPI 2021: >90 ML/MIN/1.73M2
EOSINOPHIL # BLD AUTO: 0 10E3/UL (ref 0–0.7)
EOSINOPHIL NFR BLD AUTO: 1 %
ERYTHROCYTE [DISTWIDTH] IN BLOOD BY AUTOMATED COUNT: 12.1 % (ref 10–15)
EST. AVERAGE GLUCOSE BLD GHB EST-MCNC: 243 MG/DL
FASTING STATUS PATIENT QL REPORTED: NO
FASTING STATUS PATIENT QL REPORTED: NO
GLUCOSE SERPL-MCNC: 349 MG/DL (ref 70–99)
GLUCOSE UR STRIP-MCNC: >=1000 MG/DL
HBA1C MFR BLD: 10.1 % (ref 0–5.6)
HCO3 SERPL-SCNC: 25 MMOL/L (ref 22–29)
HCT VFR BLD AUTO: 46.9 % (ref 40–53)
HDLC SERPL-MCNC: 40 MG/DL
HGB BLD-MCNC: 16 G/DL (ref 13.3–17.7)
HGB UR QL STRIP: NEGATIVE
IMM GRANULOCYTES # BLD: 0 10E3/UL
IMM GRANULOCYTES NFR BLD: 0 %
KETONES UR STRIP-MCNC: NEGATIVE MG/DL
LDLC SERPL CALC-MCNC: 157 MG/DL
LEUKOCYTE ESTERASE UR QL STRIP: NEGATIVE
LYMPHOCYTES # BLD AUTO: 1.4 10E3/UL (ref 0.8–5.3)
LYMPHOCYTES NFR BLD AUTO: 24 %
MCH RBC QN AUTO: 30.6 PG (ref 26.5–33)
MCHC RBC AUTO-ENTMCNC: 34.1 G/DL (ref 31.5–36.5)
MCV RBC AUTO: 90 FL (ref 78–100)
MONOCYTES # BLD AUTO: 0.6 10E3/UL (ref 0–1.3)
MONOCYTES NFR BLD AUTO: 11 %
NEUTROPHILS # BLD AUTO: 3.6 10E3/UL (ref 1.6–8.3)
NEUTROPHILS NFR BLD AUTO: 64 %
NITRATE UR QL: NEGATIVE
NONHDLC SERPL-MCNC: 177 MG/DL
PH UR STRIP: 6 [PH] (ref 5–7)
PLATELET # BLD AUTO: 188 10E3/UL (ref 150–450)
POTASSIUM SERPL-SCNC: 4.8 MMOL/L (ref 3.4–5.3)
PROT SERPL-MCNC: 8 G/DL (ref 6.4–8.3)
PSA SERPL DL<=0.01 NG/ML-MCNC: 0.47 NG/ML (ref 0–4.5)
RBC # BLD AUTO: 5.23 10E6/UL (ref 4.4–5.9)
SODIUM SERPL-SCNC: 133 MMOL/L (ref 135–145)
SP GR UR STRIP: 1.01 (ref 1–1.03)
TRIGL SERPL-MCNC: 100 MG/DL
UROBILINOGEN UR STRIP-ACNC: 0.2 E.U./DL
WBC # BLD AUTO: 5.6 10E3/UL (ref 4–11)

## 2024-10-03 PROCEDURE — 90480 ADMN SARSCOV2 VAC 1/ONLY CMP: CPT | Performed by: INTERNAL MEDICINE

## 2024-10-03 PROCEDURE — G0103 PSA SCREENING: HCPCS | Performed by: INTERNAL MEDICINE

## 2024-10-03 PROCEDURE — 80061 LIPID PANEL: CPT | Performed by: INTERNAL MEDICINE

## 2024-10-03 PROCEDURE — 82043 UR ALBUMIN QUANTITATIVE: CPT | Performed by: INTERNAL MEDICINE

## 2024-10-03 PROCEDURE — 99214 OFFICE O/P EST MOD 30 MIN: CPT | Mod: 25 | Performed by: INTERNAL MEDICINE

## 2024-10-03 PROCEDURE — 99396 PREV VISIT EST AGE 40-64: CPT | Mod: 25 | Performed by: INTERNAL MEDICINE

## 2024-10-03 PROCEDURE — 90673 RIV3 VACCINE NO PRESERV IM: CPT | Performed by: INTERNAL MEDICINE

## 2024-10-03 PROCEDURE — 90471 IMMUNIZATION ADMIN: CPT | Performed by: INTERNAL MEDICINE

## 2024-10-03 PROCEDURE — 81003 URINALYSIS AUTO W/O SCOPE: CPT | Performed by: INTERNAL MEDICINE

## 2024-10-03 PROCEDURE — 83036 HEMOGLOBIN GLYCOSYLATED A1C: CPT | Performed by: INTERNAL MEDICINE

## 2024-10-03 PROCEDURE — 91320 SARSCV2 VAC 30MCG TRS-SUC IM: CPT | Performed by: INTERNAL MEDICINE

## 2024-10-03 PROCEDURE — 80053 COMPREHEN METABOLIC PANEL: CPT | Performed by: INTERNAL MEDICINE

## 2024-10-03 PROCEDURE — 82570 ASSAY OF URINE CREATININE: CPT | Performed by: INTERNAL MEDICINE

## 2024-10-03 PROCEDURE — 36415 COLL VENOUS BLD VENIPUNCTURE: CPT | Performed by: INTERNAL MEDICINE

## 2024-10-03 PROCEDURE — 85025 COMPLETE CBC W/AUTO DIFF WBC: CPT | Performed by: INTERNAL MEDICINE

## 2024-10-03 SDOH — HEALTH STABILITY: PHYSICAL HEALTH: ON AVERAGE, HOW MANY DAYS PER WEEK DO YOU ENGAGE IN MODERATE TO STRENUOUS EXERCISE (LIKE A BRISK WALK)?: 2 DAYS

## 2024-10-03 ASSESSMENT — PATIENT HEALTH QUESTIONNAIRE - PHQ9
10. IF YOU CHECKED OFF ANY PROBLEMS, HOW DIFFICULT HAVE THESE PROBLEMS MADE IT FOR YOU TO DO YOUR WORK, TAKE CARE OF THINGS AT HOME, OR GET ALONG WITH OTHER PEOPLE: NOT DIFFICULT AT ALL
SUM OF ALL RESPONSES TO PHQ QUESTIONS 1-9: 8
SUM OF ALL RESPONSES TO PHQ QUESTIONS 1-9: 8

## 2024-10-03 ASSESSMENT — SOCIAL DETERMINANTS OF HEALTH (SDOH): HOW OFTEN DO YOU GET TOGETHER WITH FRIENDS OR RELATIVES?: ONCE A WEEK

## 2024-10-03 NOTE — TELEPHONE ENCOUNTER
Kyleigh Lynch RN  10/3/2024  2:30 PM CDT Back to Top      Called pt a relayed provider message. Pt states he verbalizes understanding and has already discussed results and plan with provider at today's appointment.    Janes Ramirez MD  10/3/2024  1:57 PM CDT       Team - please call patient with results. Results show he has developed diabetes. Can we get him scheduled for an IN-PERSON follow-up with myself within the next week? Ok to use any open same day or next day slot.       Appointment scheduled per MD instruction.

## 2024-10-03 NOTE — PROGRESS NOTES
Preventive Care Visit  [facility]  Janes Coleman MD, Internal Medicine  Oct 3, 2024    Assessment & Plan   Routine general medical examination at a health care facility  Reviewed PMH. Discussed healthcare maintenance issues, including cancer screenings, relevant immunizations (encouraged patient obtain Shingrix through a pharmacy in [facility]), and cardiac risk factor screenings such as for cholesterol, HTN, and DM.    Urgency incontinence  Describes fairly classic symptoms of urge incontinence. Recommended U/A, PSA to rule out other possibilities, and urology consult. Patient in agreement.  - Adult Urology  Referral; Future  - UA Macroscopic with reflex to Microscopic and Culture; Future    Imbalance  Worsening per patient. I recommended formal PT eval/treat. He was in agreement. Referral placed.  - Physical Therapy  Referral; Future    Hyperlipidemia LDL goal <100  Lab check today.  - CBC with Platelets & Differential; Future  - Comprehensive metabolic panel; Future  - Lipid panel reflex to direct LDL Non-fasting; Future    Hyperglycemia  He reports he took his BG at home recently and it was over 300. Will screen A1c.  - Hemoglobin A1c; Future    Screening for prostate cancer  PSA WNL in past.  - Prostate Specific Antigen Screen; Future    Screen for colon cancer  Overdue. Patient agreeable to repeat colonoscopy. Referral placed.  - Colonoscopy Screening  Referral; Future    Positive screening for depression on 9-item Patient Health Questionnaire (PHQ-9)  Discussed patient's answers today. He denies depression or anxiety, and declined further discussion about his mood.    Encounter for immunization  - INFLUENZA VACCINE TRIVALENT(FLUBLOK)  - COVID-19 12+ (PFIZER)    Counseling  Appropriate preventive services were addressed with this patient via screening, questionnaire, or discussion as appropriate for fall prevention, nutrition, physical activity, Tobacco-use cessation,  "social engagement, weight loss and cognition.  Checklist reviewing preventive services available has been given to the patient.  Reviewed patient's diet, addressing concerns and/or questions.   He is at risk for lack of exercise and has been provided with information to increase physical activity for the benefit of his well-being. The patient was instructed to see the dentist every 6 months. He is at risk for psychosocial distress and has been provided with information to reduce risk. The patient's PHQ-9 score is consistent with mild depression. He was provided with information regarding depression.     Signed Electronically by:  Janes Coleman MD, MPH  [facility]  Internal Medicine    Subjective   Phani is a 63 year old presenting for the following: Physical    Health Care Directive Patient does not have a Health Care Directive or Living Will    HPI  Phani presents today for a physical exam. My only other visit with Phani was 3+ years ago. He reports he has urinary symptoms. When he gets the urge to urinate, he has to go \"right away\" or he pees his pants. Ongoing for months. Denies blood in urine. Denies f/c. Reports worsening imbalance. Denies falls. Feels his L leg is more numb than his R leg. No back pain or shooting sensations. Mild. He is wondering if he has diabetes.        10/3/2024   General Health   How would you rate your overall physical health? Good   Feel stress (tense, anxious, or unable to sleep) Only a little      (!) STRESS CONCERN      10/3/2024   Nutrition   Three or more servings of calcium each day? Yes   Diet: I don't know   How many servings of fruit and vegetables per day? (!) 0-1   How many sweetened beverages each day? (!) 2          10/3/2024   Exercise   Days per week of moderate/strenous exercise 2 days      (!) EXERCISE CONCERN      10/3/2024   Social Factors   Frequency of gathering with friends or relatives Once a week   Worry food won't last " "until get money to buy more No   Food not last or not have enough money for food? No   Do you have housing? (Housing is defined as stable permanent housing and does not include staying ouside in a car, in a tent, in an abandoned building, in an overnight shelter, or couch-surfing.) Yes   Are you worried about losing your housing? No   Lack of transportation? No   Unable to get utilities (heat,electricity)? No          10/3/2024   Fall Risk   Fallen 2 or more times in the past year? No   Trouble with walking or balance? Yes   Gait Speed Test (Document in seconds) 4.2   Gait Speed Test Interpretation Less than or equal to 5.00 seconds - PASS            10/3/2024   Dental   Dentist two times every year? (!) NO      Today's PHQ-9 Score:       10/3/2024    12:41 PM   PHQ-9 SCORE   PHQ-9 Total Score MyChart 8 (Mild depression)   PHQ-9 Total Score 8         10/3/2024   Substance Use   Alcohol more than 3/day or more than 7/wk No   Do you use any other substances recreationally? No      Social History     Tobacco Use    Smoking status: Never    Smokeless tobacco: Never   Vaping Use    Vaping status: Never Used   Substance Use Topics    Alcohol use: Yes     Comment: occ    Drug use: No         10/3/2024   STI Screening   New sexual partner(s) since last STI/HIV test? No      Last PSA:   PSA   Date Value Ref Range Status   12/11/2014 0.57 0 - 4 ug/L Final     ASCVD Risk   The ASCVD Risk score (Yaquelin ALEXANDER, et al., 2019) failed to calculate for the following reasons:    Cannot find a previous HDL lab    Cannot find a previous total cholesterol lab    Reviewed and updated as needed this visit by Provider   Tobacco  Allergies  Meds  Problems  Med Hx  Surg Hx  Fam Hx             Objective    Exam  /68   Pulse 99   Resp 16   Ht 1.727 m (5' 8\")   Wt 81.7 kg (180 lb 3.2 oz)   SpO2 96%   BMI 27.40 kg/m     Estimated body mass index is 27.4 kg/m  as calculated from the following:    Height as of this " "encounter: 1.727 m (5' 8\").    Weight as of this encounter: 81.7 kg (180 lb 3.2 oz).    Physical Exam  GENERAL: In no distress.  EYES: Conjunctivae/corneas clear. EOMs grossly intact.  HENT: NC/AT, facies symmetric. Neck supple. No LAD or thyromegaly noted.  RESP: CTAB. No w/r/r.  CV: RRR, no m/r/g.  GI: NT, ND, without rebound or guarding, no CVA tenderness, no hepatomegaly appreciated.  MSK: Moves all four extremities freely.  SKIN: No significant ulcers, lesions or rashes on the visualized portions of the skin  NEURO: Alert. Oriented.  PSYCH: Linear thought process. Speech normal rate and volume. No tangential thoughts, hallucinations, or delusions.    Answers submitted by the patient for this visit:  Patient Health Questionnaire (Submitted on 10/3/2024)  If you checked off any problems, how difficult have these problems made it for you to do your work, take care of things at home, or get along with other people?: Not difficult at all  PHQ9 TOTAL SCORE: 8  "

## 2024-10-03 NOTE — PATIENT INSTRUCTIONS
- I will send you a message on [employer] when I am able to look at the results of your tests from today    - Make an appointment with our pharmacy downstairs or stop by your preferred pharmacy to discuss obtaining the Shingrix (shingles) vaccine series    - Urology referral placed to discuss urinary symptoms    - Colonoscopy referral placed    - Physical therapy referral placed to discuss balance issues

## 2024-10-04 ENCOUNTER — VIRTUAL VISIT (OUTPATIENT)
Dept: INTERNAL MEDICINE | Facility: CLINIC | Age: 64
End: 2024-10-04
Payer: COMMERCIAL

## 2024-10-04 DIAGNOSIS — E11.65 TYPE 2 DIABETES MELLITUS WITH HYPERGLYCEMIA, WITHOUT LONG-TERM CURRENT USE OF INSULIN (H): Primary | ICD-10-CM

## 2024-10-04 LAB
CREAT UR-MCNC: 35.1 MG/DL
MICROALBUMIN UR-MCNC: <12 MG/L
MICROALBUMIN/CREAT UR: NORMAL MG/G{CREAT}

## 2024-10-04 PROCEDURE — 99442 PR PHYSICIAN TELEPHONE EVALUATION 11-20 MIN: CPT | Performed by: INTERNAL MEDICINE

## 2024-10-04 RX ORDER — ROSUVASTATIN CALCIUM 10 MG/1
10 TABLET, COATED ORAL DAILY
Qty: 90 TABLET | Refills: 4 | Status: SHIPPED | OUTPATIENT
Start: 2024-10-04

## 2024-10-04 NOTE — PROGRESS NOTES
Phani is a 63 year old who is being evaluated via a billable telephone visit.    Originating Location (pt. Location): Home  Distant Location (provider location):  On-site    Assessment & Plan   Type 2 diabetes mellitus with hyperglycemia, without long-term current use of insulin (H)  Spent today's visit discussing this new diagnosis with patient.  I discussed what diabetes is, why we care about it, and how we treated.  He was interested in meeting with our diabetic education team, something that I strongly recommended as well.  Referral is placed.  I also recommended that we start metformin and titrate him up to the maximum dose given his A1c is above 10%.  After some discussion, he was agreeable.  Lastly, I recommended we start rosuvastatin to help treat his high cholesterol especially in the setting of this new diagnosis of diabetes.  He was agreeable.  I assisted him in making a 3-month follow-up visit with myself in early January 2025.  At that visit, we ought to repeat his labs to query response to medications.  In the interim, he got to meet with our diabetic education team as above.  Eye exam referral placed as well.  - metFORMIN (GLUCOPHAGE) 1000 MG tablet; Take 0.5 tablets (500 mg) by mouth daily (with dinner) for 6 days, THEN 1 tablet (1,000 mg) daily (with dinner) for 4 days, THEN 1 tablet (1,000 mg) 2 times daily (with meals).  - rosuvastatin (CRESTOR) 10 MG tablet; Take 1 tablet (10 mg) by mouth daily.  - Adult Diabetes Education  Referral; Future    Signed Electronically by:  Janes Coleman MD, MPH  [facility]  Internal Medicine    Subjective   Phani is a 63 year old who presents for a next day virtual telephone visit with chief concern of: lab follow-up.  I last saw this patient yesterday for his physical.  Routine lab work discovered a new diagnosis of diabetes.  Today's visit is to follow-up on this new diagnosis.  Patient is interested in meeting with  a dietitian to change his diet.  He is wondering about other treatment options.        Objective       Vitals: No vitals were obtained today due to virtual visit.    Physical Exam   General: Alert and no distress //Respiratory: No audible wheeze, cough, or shortness of breath // Psychiatric:  Appropriate affect, tone, and pace of words    Phone call duration: 12 minutes

## 2024-10-09 ENCOUNTER — VIRTUAL VISIT (OUTPATIENT)
Dept: EDUCATION SERVICES | Facility: CLINIC | Age: 64
End: 2024-10-09
Payer: COMMERCIAL

## 2024-10-09 DIAGNOSIS — E11.65 TYPE 2 DIABETES MELLITUS WITH HYPERGLYCEMIA, WITHOUT LONG-TERM CURRENT USE OF INSULIN (H): Primary | ICD-10-CM

## 2024-10-09 PROCEDURE — 98967 PH1 ASSMT&MGMT NQHP 11-20: CPT | Mod: 93 | Performed by: DIETITIAN, REGISTERED

## 2024-10-09 RX ORDER — LANCETS
EACH MISCELLANEOUS
Qty: 100 EACH | Refills: 6 | Status: SHIPPED | OUTPATIENT
Start: 2024-10-09

## 2024-10-09 NOTE — LETTER
10/9/2024         RE: Phani Alba  [address]  [place] [facility] [address]        Dear Colleague,    Thank you for referring your patient, Phani Alba, to the [facility]. Please see a copy of my visit note below.    Diabetes Self-Management Education & Support    Presents for: Initial Assessment for new diagnosis    Type of Service: Telephone Visit    Originating Location (Patient Location): Other - parked car  Distant Location (Provider Location): Offsite  Mode of Communication:  Telephone    Telephone Visit Start Time:  1:00pm  Telephone Visit End Time (telephone visit stop time): 1:17pm    How would patient like to obtain AVS? MyChart      ASSESSMENT:  Met with patient for introduction to diabetes. Patient confirmed he is currently on 500 mg Metformin with plans to increase to 1000 mg tomorrow. Provided brief education on pathophysiology. Reviewed how diabetes education is completed including availability for 1:1 education and zoom group classes (these cover the 7 self-care behaviors). Phani is interested in taking the 3 zoom classes and will then follow-up with a 1:1 meeting with a diabetes educator. Ordered meter/CGM for patient. Reviewed technique, BG goals, and importance of testing.     Patient's most recent   Lab Results   Component Value Date    A1C 10.1 10/03/2024     is not meeting goal of <7.0    Diabetes knowledge and skills assessment:   Patient is knowledgeable in diabetes management concepts related to: review in all    Continue education with the following diabetes management concepts: review in all    Based on learning assessment above, most appropriate setting for further diabetes education would be: Group class or Individual setting.      PLAN  -Zoom classes and 1:1 education  -Start checking BG     Topics to cover at upcoming visits: all    Follow-up: 10/10/24 initial group class    See Care Plan for co-developed, patient-state behavior change  "goals.  AVS provided for patient today.    Education Materials Provided:  [employer] Understanding Diabetes Booklet, BG Log Sheet, My Plate Planner, types of diabetes medicines, diabetes distress, and diabetes care goals.    SUBJECTIVE/OBJECTIVE:  Presents for: Initial Assessment for new diagnosis  Accompanied by: Self  Diabetes education in the past 24mo: No  Focus of Visit: Patient Unsure  Diabetes type: Type 2  Date of diagnosis: 10/3/24  How confident are you filling out medical forms by yourself:: Not Assessed  Difficulty affording diabetes medication?: No  Difficulty affording diabetes testing supplies?: No  Cultural Influences/Ethnic Background:  Choose not to answer      Diabetes Symptoms & Complications:     Complications assessed today?: No    Patient Problem List and Family Medical History reviewed for relevant medical history, current medical status, and diabetes risk factors.    Vitals:  There were no vitals taken for this visit.  Estimated body mass index is 27.4 kg/m  as calculated from the following:    Height as of 10/3/24: 1.727 m (5' 8\").    Weight as of 10/3/24: 81.7 kg (180 lb 3.2 oz).   Last 3 BP:   BP Readings from Last 3 Encounters:   10/03/24 130/68   10/19/22 132/64   11/16/21 130/79       History   Smoking Status     Never   Smokeless Tobacco     Never       Labs:  Lab Results   Component Value Date    A1C 10.1 10/03/2024     Lab Results   Component Value Date     10/03/2024    GLC 92 12/11/2014     Lab Results   Component Value Date     10/03/2024     12/11/2014     HDL Cholesterol   Date Value Ref Range Status   12/11/2014 47 >40 mg/dL Final     Direct Measure HDL   Date Value Ref Range Status   10/03/2024 40 >=40 mg/dL Final     GFR Estimate   Date Value Ref Range Status   10/03/2024 >90 >60 mL/min/1.73m2 Final     Comment:     eGFR calculated using 2021 CKD-EPI equation.     No results found for: \"GFRESTBLACK\"  Lab Results   Component Value Date    CR 0.95 " "10/03/2024     No results found for: \"MICROALBUMIN\"    Healthy Eating:  Healthy Eating Assessed Today: No  Has patient met with a dietitian in the past?: No    Being Active:  Being Active Assessed Today: No    Monitoring:  Monitoring Assessed Today: Yes  Did patient bring glucose meter to appointment? : No  Blood Glucose Meter: Unknown  Times checking blood sugar at home (number): Never    Taking Medications:  Diabetes Medication(s)       Biguanides       metFORMIN (GLUCOPHAGE) 1000 MG tablet Take 0.5 tablets (500 mg) by mouth daily (with dinner) for 6 days, THEN 1 tablet (1,000 mg) daily (with dinner) for 4 days, THEN 1 tablet (1,000 mg) 2 times daily (with meals).            Taking Medication Assessed Today: Yes  Current Treatments: Oral Medication (taken by mouth)  Problems taking diabetes medications regularly?: No  Diabetes medication side effects?: No    Problem Solving:  Problem Solving Assessed Today: No    Reducing Risks:  Reducing Risks Assessed Today: No    Healthy Coping:  Healthy Coping Assessed Today: Yes  Emotional response to diabetes: Ready to learn  Stage of change: PREPARATION (Decided to change - considering how)    Care Plan and Education Provided:  -Scheduled appointments and sent diabetes education materials via email.  -Ordered glucometer / CGM for patient and reviewed proper technique / BG goals.     Keke Christian, SATYA, RDN, LD, [facility]  Diabetes     [phone]  Diabetes Education Care Team: [phone]      Time Spent: 17 minutes  Encounter Type: Individual    Any diabetes medication dose changes were made via the CDE Protocol per the patient's referring provider. A copy of this encounter was shared with the provider.      "

## 2024-10-09 NOTE — PATIENT INSTRUCTIONS
APPOINTMENT REMINDERS:    Check your blood sugar 1-2 times per day (fasting, 2 hours after eating).  Your blood sugar goals:  mg/dL before eating and  mg/dL 2 hours after eating (or per your doctor).   Always bring your blood sugar log and meter to your diabetes-related appointments.      You are signed up for upcoming virtual type 2 classes. Class topics and dates are listed below:     Healthy Eating & Being Active - 10/17/24 at 2:30pm  Monitoring & Medication - 10/24/24 at 2:30pm  Problem Solving, Reducing Risks & Healthy Coping - 10/10/24 at 2:30pm    You will find the classes listed on your appointment schedule on [employer]. On the day of your class, you will enter class via [employer] and will be taken to the [employer] platform.   Please try to log in ~10 minutes before the class start to ensure you have appropriate software or in case you have any issues.         I truly appreciate your feedback on our appointment together. You will either receive an email, text message, or mailing survey about today's appointment. Please fill this out at your soonest convenience so I can continue to improve upon my practice. Thank you!      Contact Information:  SATYA Solitario, NORAH, LD, [facility]  Diabetes     [phone]  Diabetes Education Care Team: [phone]

## 2024-10-09 NOTE — PROGRESS NOTES
Diabetes Self-Management Education & Support    Presents for: Initial Assessment for new diagnosis    Type of Service: Telephone Visit    Originating Location (Patient Location): Other - parked car  Distant Location (Provider Location): Offsite  Mode of Communication:  Telephone    Telephone Visit Start Time:  1:00pm  Telephone Visit End Time (telephone visit stop time): 1:17pm    How would patient like to obtain AVS? MyChart      ASSESSMENT:  Met with patient for introduction to diabetes. Patient confirmed he is currently on 500 mg Metformin with plans to increase to 1000 mg tomorrow. Provided brief education on pathophysiology. Reviewed how diabetes education is completed including availability for 1:1 education and zoom group classes (these cover the 7 self-care behaviors). Phani is interested in taking the 3 zoom classes and will then follow-up with a 1:1 meeting with a diabetes educator. Ordered meter/CGM for patient. Reviewed technique, BG goals, and importance of testing.     Patient's most recent   Lab Results   Component Value Date    A1C 10.1 10/03/2024     is not meeting goal of <7.0    Diabetes knowledge and skills assessment:   Patient is knowledgeable in diabetes management concepts related to: review in all    Continue education with the following diabetes management concepts: review in all    Based on learning assessment above, most appropriate setting for further diabetes education would be: Group class or Individual setting.      PLAN  -Zoom classes and 1:1 education  -Start checking BG     Topics to cover at upcoming visits: all    Follow-up: 10/10/24 initial group class    See Care Plan for co-developed, patient-state behavior change goals.  AVS provided for patient today.    Education Materials Provided:  [employer] Understanding Diabetes Booklet, BG Log Sheet, My Plate Planner, types of diabetes medicines, diabetes distress, and diabetes care goals.    SUBJECTIVE/OBJECTIVE:  Presents  "for: Initial Assessment for new diagnosis  Accompanied by: Self  Diabetes education in the past 24mo: No  Focus of Visit: Patient Unsure  Diabetes type: Type 2  Date of diagnosis: 10/3/24  How confident are you filling out medical forms by yourself:: Not Assessed  Difficulty affording diabetes medication?: No  Difficulty affording diabetes testing supplies?: No  Cultural Influences/Ethnic Background:  Choose not to answer      Diabetes Symptoms & Complications:     Complications assessed today?: No    Patient Problem List and Family Medical History reviewed for relevant medical history, current medical status, and diabetes risk factors.    Vitals:  There were no vitals taken for this visit.  Estimated body mass index is 27.4 kg/m  as calculated from the following:    Height as of 10/3/24: 1.727 m (5' 8\").    Weight as of 10/3/24: 81.7 kg (180 lb 3.2 oz).   Last 3 BP:   BP Readings from Last 3 Encounters:   10/03/24 130/68   10/19/22 132/64   11/16/21 130/79       History   Smoking Status    Never   Smokeless Tobacco    Never       Labs:  Lab Results   Component Value Date    A1C 10.1 10/03/2024     Lab Results   Component Value Date     10/03/2024    GLC 92 12/11/2014     Lab Results   Component Value Date     10/03/2024     12/11/2014     HDL Cholesterol   Date Value Ref Range Status   12/11/2014 47 >40 mg/dL Final     Direct Measure HDL   Date Value Ref Range Status   10/03/2024 40 >=40 mg/dL Final     GFR Estimate   Date Value Ref Range Status   10/03/2024 >90 >60 mL/min/1.73m2 Final     Comment:     eGFR calculated using 2021 CKD-EPI equation.     No results found for: \"GFRESTBLACK\"  Lab Results   Component Value Date    CR 0.95 10/03/2024     No results found for: \"MICROALBUMIN\"    Healthy Eating:  Healthy Eating Assessed Today: No  Has patient met with a dietitian in the past?: No    Being Active:  Being Active Assessed Today: No    Monitoring:  Monitoring Assessed Today: Yes  Did patient " bring glucose meter to appointment? : No  Blood Glucose Meter: Unknown  Times checking blood sugar at home (number): Never    Taking Medications:  Diabetes Medication(s)       Biguanides       metFORMIN (GLUCOPHAGE) 1000 MG tablet Take 0.5 tablets (500 mg) by mouth daily (with dinner) for 6 days, THEN 1 tablet (1,000 mg) daily (with dinner) for 4 days, THEN 1 tablet (1,000 mg) 2 times daily (with meals).            Taking Medication Assessed Today: Yes  Current Treatments: Oral Medication (taken by mouth)  Problems taking diabetes medications regularly?: No  Diabetes medication side effects?: No    Problem Solving:  Problem Solving Assessed Today: No    Reducing Risks:  Reducing Risks Assessed Today: No    Healthy Coping:  Healthy Coping Assessed Today: Yes  Emotional response to diabetes: Ready to learn  Stage of change: PREPARATION (Decided to change - considering how)    Care Plan and Education Provided:  -Scheduled appointments and sent diabetes education materials via email.  -Ordered glucometer / CGM for patient and reviewed proper technique / BG goals.     Keke Christian, SATYA, RDN, LD, [facility]  Diabetes     [phone]  Diabetes Education Care Team: [phone]      Time Spent: 17 minutes  Encounter Type: Individual    Any diabetes medication dose changes were made via the CDE Protocol per the patient's referring provider. A copy of this encounter was shared with the provider.

## 2024-11-12 ENCOUNTER — TELEPHONE (OUTPATIENT)
Dept: GASTROENTEROLOGY | Facility: CLINIC | Age: 64
End: 2024-11-12
Payer: COMMERCIAL

## 2024-11-12 NOTE — TELEPHONE ENCOUNTER
"Endoscopy Scheduling Screen    Have you had any respiratory illness or flu-like symptoms in the last 10 days?  No    What is your communication preference for Instructions and/or Bowel Prep?   MyChart    What insurance is in the chart?  Other:  R    Ordering/Referring Provider: Janes Ramirez MD   (If ordering provider performs procedure, schedule with ordering provider unless otherwise instructed. )    BMI: Estimated body mass index is 27.4 kg/m  as calculated from the following:    Height as of 10/3/24: 1.727 m (5' 8\").    Weight as of 10/3/24: 81.7 kg (180 lb 3.2 oz).     Sedation Ordered  moderate sedation.   If patient BMI > 50 do not schedule in [facility].    If patient BMI > 45 do not schedule at [facility].    Are you taking methadone or Suboxone?  NO, No RN review required.    Have you been diagnosed and are being treated for severe PTSD or severe anxiety?  NO, No RN review required.    Are you taking any prescription medications for pain 3 or more times per week?   NO, No RN review required.    Do you have a history of malignant hyperthermia?  No    (Females) Are you currently pregnant?   No     Have you been diagnosed or told you have pulmonary hypertension?   No    Do you have an LVAD?  No    Have you been told you have moderate to severe sleep apnea?  No.    Have you been told you have COPD, asthma, or any other lung disease?  No    Do you have any heart conditions?  No     Have you ever had or are you waiting for an organ transplant?  No. Continue scheduling, no site restrictions.    Have you had a stroke or transient ischemic attack (TIA aka \"mini stroke\" in the last 6 months?   No    Have you been diagnosed with or been told you have cirrhosis of the liver?   No.    Are you currently on dialysis?   No    Do you need assistance transferring?   No    BMI: Estimated body mass index is 27.4 kg/m  as calculated from the following:    Height as of 10/3/24: 1.727 m (5' 8\").    Weight as of 10/3/24: 81.7 kg " (180 lb 3.2 oz).     Is patients BMI > 40 and scheduling location [facility]?  No    Do you take an injectable or oral medication for weight loss or diabetes (excluding insulin)?  Yes, hold time can be up to 7 days. Please consult with you prescribing provider to discuss endoscopy recommendations. (Please schedule at least 7 days out.)    Do you take the medication Naltrexone?  No    Do you take blood thinners?  No       Prep   Are you currently on dialysis or do you have chronic kidney disease?  No    Do you have a diagnosis of diabetes?  Yes (Golytely Prep)    Do you have a diagnosis of cystic fibrosis (CF)?  No    On a regular basis do you go 3 -5 days between bowel movements?  No    BMI > 40?  No    Preferred Pharmacy:    [address] [address]  Phone: [phone] Fax: [phone] Alternate Fax: [phone], [phone]      Final Scheduling Details     Procedure scheduled  Colonoscopy    Surgeon:  Ami     Date of procedure:  12/12/2024     Pre-OP / PAC:   No - Not required for this site.    Location  [facility] - Patient preference.    Sedation   Moderate Sedation - Per order.      Patient Reminders:   You will receive a call from a Nurse to review instructions and health history.  This assessment must be completed prior to your procedure.  Failure to complete the Nurse assessment may result in the procedure being cancelled.      On the day of your procedure, please designate an adult(s) who can drive you home stay with you for the next 24 hours. The medicines used in the exam will make you sleepy. You will not be able to drive.      You cannot take public transportation, ride share services, or non-medical taxi service without a responsible caregiver.  Medical transport services are allowed with the requirement that a responsible caregiver will receive you at your destination.  We require that drivers and caregivers are confirmed prior to  your procedure.

## 2024-11-27 ENCOUNTER — TELEPHONE (OUTPATIENT)
Dept: GASTROENTEROLOGY | Facility: CLINIC | Age: 64
End: 2024-11-27
Payer: COMMERCIAL

## 2024-11-27 NOTE — TELEPHONE ENCOUNTER
Pre assessment completed for upcoming procedure.   (Please see previous telephone encounter notes for complete details)    Patient  returned call.       Procedure details:    Arrival time and facility location reviewed.    Pre op exam needed? No.    Designated  policy reviewed. Instructed to have someone stay 6  hours post procedure.       Medication review:    Oral diabetic medication(s): Metformin (glucophage): HOLD day of procedure.      Prep for procedure:     Procedure prep instructions reviewed.        Any additional information needed:  N/A      Patient  verbalized understanding and had no questions or concerns at this time.      Michelle Raza RN  Endoscopy Procedure Pre Assessment   [phone] option 2

## 2024-11-27 NOTE — TELEPHONE ENCOUNTER
Attempted to contact patient in order to complete pre assessment questions.     No answer. Left message to return call to [phone] option 2.    Callback required communication sent via "[employer].      Procedure details:    Patient scheduled for Colonoscopy on 12/12/24.     Arrival time: 1315. Procedure time 1400    Facility location: [facility]; [facility] [place] [address]. Check in location: 1st Floor [facility].     Sedation type: Conscious sedation     Pre op exam needed? No.    Indication for procedure: Hx colon polyps      Chart review:     Electronic implanted devices? No    Recent diagnosis of diverticulitis within the last 6 weeks? No      Medication review:    Diabetic? Yes. Oral diabetic medications: Metformin (glucophage): HOLD day of procedure.    Anticoagulants? No    Weight loss medication/injectable? No GLP-1 medication per patient's medication list. Nursing to verify with pre-assessment call.    Other medication HOLDING recommendations:  N/A      Prep for procedure:     Bowel prep recommendation: Standard Golytely. Bowel prep prescription previously sent on 11/25/24 to [place], [place] - [address] [place] [address]  Due to: diabetes.     Prep instructions resent via "[employer].       Lies Jacobo RN  Endoscopy Procedure Pre Assessment

## 2024-12-12 ENCOUNTER — HOSPITAL ENCOUNTER (OUTPATIENT)
Facility: CLINIC | Age: 64
Discharge: HOME | End: 2024-12-12
Attending: COLON & RECTAL SURGERY
Payer: COMMERCIAL

## 2024-12-12 VITALS
OXYGEN SATURATION: 99 % | HEART RATE: 65 BPM | DIASTOLIC BLOOD PRESSURE: 86 MMHG | BODY MASS INDEX: 27.55 KG/M2 | RESPIRATION RATE: 23 BRPM | HEIGHT: 69 IN | SYSTOLIC BLOOD PRESSURE: 143 MMHG | WEIGHT: 186 LBS

## 2024-12-12 LAB
COLONOSCOPY: NORMAL
GLUCOSE BLDC GLUCOMTR-MCNC: 86 MG/DL (ref 70–99)

## 2024-12-12 PROCEDURE — 88305 TISSUE EXAM BY PATHOLOGIST: CPT | Mod: TC | Performed by: COLON & RECTAL SURGERY

## 2024-12-12 PROCEDURE — G0500 MOD SEDAT ENDO SERVICE >5YRS: HCPCS | Performed by: COLON & RECTAL SURGERY

## 2024-12-12 PROCEDURE — 82962 GLUCOSE BLOOD TEST: CPT

## 2024-12-12 PROCEDURE — 99153 MOD SED SAME PHYS/QHP EA: CPT | Performed by: COLON & RECTAL SURGERY

## 2024-12-12 PROCEDURE — 45385 COLONOSCOPY W/LESION REMOVAL: CPT | Performed by: COLON & RECTAL SURGERY

## 2024-12-12 PROCEDURE — 250N000011 HC RX IP 250 OP 636: Performed by: COLON & RECTAL SURGERY

## 2024-12-12 RX ORDER — FENTANYL CITRATE 50 UG/ML
INJECTION, SOLUTION INTRAMUSCULAR; INTRAVENOUS PRN
Status: DISCONTINUED | OUTPATIENT
Start: 2024-12-12 | End: 2024-12-12 | Stop reason: HOSPADM

## 2024-12-12 ASSESSMENT — ACTIVITIES OF DAILY LIVING (ADL)
ADLS_ACUITY_SCORE: 41

## 2024-12-12 NOTE — H&P
Colon & Rectal Surgery History and Physical  Pre-Endoscopy Procedure Note    History of Present Illness   I have been asked by Dr. Ramirez to evaluate this 63 year old male for colorectal polyp surveillance. He currently denies any abdominal pain, weight loss, bleeding per rectum, or recent change in bowel habits.    Past Medical History  Diagnosis Date    Diabetes      ED (erectile dysfunction) 12/11/2014    Hypoesthesia 05/21/2018    Keloid of skin     Low back pain with left-sided sciatica, unspecified back pain laterality, unspecified chronicity 05/21/2018    Rotator cuff syndrome, left 05/21/2018    Sebaceous cyst 12/11/2014    Tubular adenoma of colon        Past Surgical History  Procedure Laterality Date    APPENDECTOMY      COLONOSCOPY          Medications  Medications Prior to Admission   Medication Sig    metFORMIN (GLUCOPHAGE) 1000 MG tablet Take 0.5 tablets (500 mg) by mouth daily (with dinner) for 6 days, THEN 1 tablet (1,000 mg) daily (with dinner) for 4 days, THEN 1 tablet (1,000 mg) 2 times daily (with meals).    rosuvastatin (CRESTOR) 10 MG tablet Take 1 tablet (10 mg) by mouth daily.    acetaminophen (TYLENOL) 500 MG tablet Take 500-1,000 mg by mouth every 6 hours as needed for mild pain    Ascorbic Acid (VITAMIN C) 500 MG CAPS     multivitamin w/minerals (MULTI-VITAMIN) tablet Take 1 tablet by mouth daily One-a-day Men       Allergies   No Known Allergies     Family History   Family history is not contributory    Social History   He reports that he has never smoked. He has never used smokeless tobacco. He reports current alcohol use. He reports that he does not use drugs.    Review of Systems   Constitutional:  No fever, weight change or fatigue.    Eyes:     No dry eyes or vision changes.   Ears/Nose/Throat/Neck:  No oral ulcers, sore throat or voice change.    Cardiovascular:   No palpitations, syncope, angina or edema.   Respiratory:    No chest pain, excessive sleepiness, shortness of breath  "or hemoptysis.    Gastrointestinal:   No abdominal pain, nausea, vomiting, diarrhea or heartburn.    Genitourinary:   No dysuria, hematuria, urinary retention or urinary frequency.   Musculoskeletal:  No joint swelling or arthralgias.    Dermatologic:  No skin rash or other skin changes.   Neurologic:    No focal weakness or numbness. No neuropathy.   Psychiatric:    No depression, anxiety, suicidal ideation, or paranoid ideation.   Endocrine:   No cold or heat intolerance, polydipsia, hirsutism, change in libido, or flushing.   Hematology/Lymphatic:  No bleeding or lymphadenopathy.    Allergy/Immunology:  No rhinitis or hives.     Physical Exam   Vitals:  Blood pressure 155/93, pulse 66, resp. rate 14, height 1.753 m (5' 9\"), weight 84.4 kg (186 lb), SpO2 100%.    General:  Alert and oriented to person, place and time   Airway: Normal oropharyngeal airway and neck mobility   Lungs:  Clear bilaterally   Heart:  Regular rate and rhythm   Abdomen: Soft, NT, ND, no masses   Extremities: Warm, good capillary refill    ASA Grade: II (mild systemic disease)    Impression: Cleared for use of conscious sedation for colorectal cancer screening    Plan: Proceed with colonoscopy     Bela Mueller MD  [facility]  [phone]  "

## 2024-12-16 LAB
PATH REPORT.COMMENTS IMP SPEC: NORMAL
PATH REPORT.COMMENTS IMP SPEC: NORMAL
PATH REPORT.FINAL DX SPEC: NORMAL
PATH REPORT.GROSS SPEC: NORMAL
PATH REPORT.MICROSCOPIC SPEC OTHER STN: NORMAL
PATH REPORT.RELEVANT HX SPEC: NORMAL
PHOTO IMAGE: NORMAL

## 2024-12-16 PROCEDURE — 88305 TISSUE EXAM BY PATHOLOGIST: CPT | Mod: 26 | Performed by: PATHOLOGY

## 2024-12-27 PROBLEM — D12.6 ADENOMATOUS COLON POLYP: Status: ACTIVE | Noted: 2024-12-27

## 2025-01-06 ENCOUNTER — OFFICE VISIT (OUTPATIENT)
Dept: UROLOGY | Facility: CLINIC | Age: 65
End: 2025-01-06
Payer: COMMERCIAL

## 2025-01-06 VITALS
OXYGEN SATURATION: 98 % | WEIGHT: 185 LBS | DIASTOLIC BLOOD PRESSURE: 76 MMHG | HEART RATE: 52 BPM | BODY MASS INDEX: 28.04 KG/M2 | SYSTOLIC BLOOD PRESSURE: 129 MMHG | HEIGHT: 68 IN

## 2025-01-06 DIAGNOSIS — N39.41 URGENCY INCONTINENCE: ICD-10-CM

## 2025-01-06 LAB
ALBUMIN UR-MCNC: NEGATIVE MG/DL
APPEARANCE UR: CLEAR
BILIRUB UR QL STRIP: NEGATIVE
COLOR UR AUTO: YELLOW
GLUCOSE UR STRIP-MCNC: NEGATIVE MG/DL
HGB UR QL STRIP: NEGATIVE
KETONES UR STRIP-MCNC: NEGATIVE MG/DL
LEUKOCYTE ESTERASE UR QL STRIP: NEGATIVE
NITRATE UR QL: NEGATIVE
PH UR STRIP: 8.5 [PH] (ref 5–7)
RESIDUAL VOLUME (RV) (EXTERNAL): 5
SP GR UR STRIP: 1.02 (ref 1–1.03)
UROBILINOGEN UR STRIP-ACNC: 0.2 E.U./DL

## 2025-01-06 PROCEDURE — 81003 URINALYSIS AUTO W/O SCOPE: CPT | Mod: QW | Performed by: NURSE PRACTITIONER

## 2025-01-06 ASSESSMENT — PAIN SCALES - GENERAL: PAINLEVEL_OUTOF10: NO PAIN (0)

## 2025-01-06 NOTE — LETTER
1/6/2025       RE: Phani Alba  [address]  [facility] [address]     Dear Colleague,    Thank you for referring your patient, Phani Alba, to the [facility]. Please see a copy of my visit note below.      Urology Outpatient Visit    Name: Phani Alba    MRN: [id number]   YOB: 1960               Chief Complaint:   LUTS/BPH         Impression and Plan:   Impression / Plan:   Phani Alba is a 64 year old male with LUTS suspected to be 2/2 BPH.       It was my pleasure to meet with Mr. Alba in clinic today to discuss his lower urinary tract symptoms. Patient presentation is not consistent with prostate cancer, UTI, urinary obstruction, prostatitis, neurogenic bladder, diabetes, nor diuretic related. I explained that his lower urinary tract symptoms are likely secondary to benign prostatic hyperplasia (BPH). We reviewed the natural history of BPH, its prevalence, and management options. We discussed that, in general, treatment of BPH is based on the extent of bother caused by lower urinary tract symptoms. We then reviewed options for ongoing management including observation/watchful waiting, lifestyle modifications, medical management, vs. other surgical options. Pros and cons of these options were discussed in detail. All patient questions, comments, concerns addressed.     Following our discussion, Mr. Alba expressed an interest in proceeding with lifestyle/behavorial changes.   Follow-up prn      Thank you for the opportunity to participate in the care of Phani Alba.     Valerie Abbott, APRN, CNP  [facility] - Department of Urology  [phone]          History of Present Illness:   Phani Alba is a 64 year old male with T2DM (last A1c 10.1), HLD, ED, here today to discuss his LUTS. He primarily complains of urinary urgency. He denies urge incontinence, gross hematuria, dysuria. No  fam Hx PCa.     AUASS 2-3-3-4-3-0-2    PVR 5 mL    Lab Results   Component Value Date    PSA 0.47 10/03/2024    PSA 0.57 12/11/2014       History is obtained from patient & EMR          Past Medical History:     Past Medical History:   Diagnosis Date     Diabetes (H)      ED (erectile dysfunction) 12/11/2014     Hypoesthesia 05/21/2018     Hypoesthesia 05/21/2018     Keloid of skin      Low back pain with left-sided sciatica, unspecified back pain laterality, unspecified chronicity 05/21/2018     Rotator cuff syndrome, left 05/21/2018     Sebaceous cyst 12/11/2014     Tubular adenoma of colon           Past Surgical History:     Past Surgical History:   Procedure Laterality Date     APPENDECTOMY       COLONOSCOPY       COLONOSCOPY N/A 12/12/2024    Procedure: Colonoscopy;  Surgeon: Bela Mueller MD;  Location:  GI          Social History:     Social History     Tobacco Use     Smoking status: Never     Smokeless tobacco: Never   Substance Use Topics     Alcohol use: Yes     Comment: occ          Family History:     Family History   Problem Relation Age of Onset     Family History Negative No family hx of           Allergies:   No Known Allergies       Medications:     Current Outpatient Medications   Medication Sig Dispense Refill     acetaminophen (TYLENOL) 500 MG tablet Take 500-1,000 mg by mouth every 6 hours as needed for mild pain       Ascorbic Acid (VITAMIN C) 500 MG CAPS        blood glucose (NO BRAND SPECIFIED) test strip Use to test blood sugar 2 times daily or as directed. To accompany: Blood Glucose Monitor Brands: per insurance. 100 strip 6     blood glucose monitoring (NO BRAND SPECIFIED) meter device kit Use to test blood sugar 2 times daily or as directed. Preferred blood glucose meter OR supplies to accompany: Blood Glucose Monitor [employer]ands: per insurance. 1 kit 0     metFORMIN (GLUCOPHAGE) 1000 MG tablet Take 0.5 tablets (500 mg) by mouth daily (with dinner) for 6 days, THEN 1 tablet (1,000  mg) daily (with dinner) for 4 days, THEN 1 tablet (1,000 mg) 2 times daily (with meals). 167 tablet 0     multivitamin w/minerals (MULTI-VITAMIN) tablet Take 1 tablet by mouth daily One-a-day Men       rosuvastatin (CRESTOR) 10 MG tablet Take 1 tablet (10 mg) by mouth daily. 90 tablet 4     thin (NO BRAND SPECIFIED) lancets Use with lanceting device. To accompany: Blood Glucose Monitor Brands: per insurance. 100 each 6     No current facility-administered medications for this visit.          Review of Systems:    ROS: See HPI for pertinent details.  Remainder of 10-point ROS negative.         Physical Exam:   VS:  T: Data Unavailable    HR: 52    BP: 129/76    RR: Data Unavailable     GEN:  Alert.  NAD.   HEENT:  Sclerae anicteric.    CV:  No obvious jugular venous distension.  LUNGS: No respiratory distress, breathing comfortably wo accessory muscle use.  ABD:  ND.     SKIN:  Dry. No visible rashes.   NEURO:  CN grossly intact.          Laboratory Data:     Lab Results   Component Value Date    PSA 0.47 10/03/2024    PSA 0.57 12/11/2014     Lab Results   Component Value Date    CR 0.95 10/03/2024     Lab Results   Component Value Date    GFRESTIMATED >90 10/03/2024            Again, thank you for allowing me to participate in the care of your patient.      Sincerely,    AMMON Chaudhary CNP

## 2025-01-06 NOTE — NURSING NOTE
Chief Complaint   Patient presents with    Incontinence    Patient states he goes a lot during the day and sometimes is incontinent, and voids twice during the night. Patients post void residual was 5 ml.  Teresa Magallanes LPN

## 2025-01-06 NOTE — PROGRESS NOTES
Urology Outpatient Visit    Name: Phani Alba    MRN: [id number]   YOB: 1960               Chief Complaint:   LUTS/BPH         Impression and Plan:   Impression / Plan:   Phani Alba is a 64 year old male with LUTS suspected to be 2/2 BPH.       It was my pleasure to meet with Mr. Alba in clinic today to discuss his lower urinary tract symptoms. Patient presentation is not consistent with prostate cancer, UTI, urinary obstruction, prostatitis, neurogenic bladder, diabetes, nor diuretic related. I explained that his lower urinary tract symptoms are likely secondary to benign prostatic hyperplasia (BPH). We reviewed the natural history of BPH, its prevalence, and management options. We discussed that, in general, treatment of BPH is based on the extent of bother caused by lower urinary tract symptoms. We then reviewed options for ongoing management including observation/watchful waiting, lifestyle modifications, medical management, vs. other surgical options. Pros and cons of these options were discussed in detail. All patient questions, comments, concerns addressed.     Following our discussion, Mr. Alba expressed an interest in proceeding with lifestyle/behavorial changes.   Follow-up prn      Thank you for the opportunity to participate in the care of Phani Alba.     Valerie Abbott APRN, CNP  [facility] - Department of Urology  [phone]          History of Present Illness:   Phani Alba is a 64 year old male with T2DM (last A1c 10.1), HLD, ED, here today to discuss his LUTS. He primarily complains of urinary urgency. He denies urge incontinence, gross hematuria, dysuria. No fam Hx PCa.     AUASS 2-3-3-4-3-0-2    PVR 5 mL    Lab Results   Component Value Date    PSA 0.47 10/03/2024    PSA 0.57 12/11/2014       History is obtained from patient & EMR          Past Medical History:     Past Medical History:   Diagnosis Date    Diabetes (H)     ED (erectile dysfunction)  12/11/2014    Hypoesthesia 05/21/2018    Hypoesthesia 05/21/2018    Keloid of skin     Low back pain with left-sided sciatica, unspecified back pain laterality, unspecified chronicity 05/21/2018    Rotator cuff syndrome, left 05/21/2018    Sebaceous cyst 12/11/2014    Tubular adenoma of colon           Past Surgical History:     Past Surgical History:   Procedure Laterality Date    APPENDECTOMY      COLONOSCOPY      COLONOSCOPY N/A 12/12/2024    Procedure: Colonoscopy;  Surgeon: Bela Mueller MD;  Location:  [facility]          Social History:     Social History     Tobacco Use    Smoking status: Never    Smokeless tobacco: Never   Substance Use Topics    Alcohol use: Yes     Comment: occ          Family History:     Family History   Problem Relation Age of Onset    Family History Negative No family hx of           Allergies:   No Known Allergies       Medications:     Current Outpatient Medications   Medication Sig Dispense Refill    acetaminophen (TYLENOL) 500 MG tablet Take 500-1,000 mg by mouth every 6 hours as needed for mild pain      Ascorbic Acid (VITAMIN C) 500 MG CAPS       blood glucose (NO BRAND SPECIFIED) test strip Use to test blood sugar 2 times daily or as directed. To accompany: Blood Glucose Monitor Brands: per insurance. 100 strip 6    blood glucose monitoring (NO BRAND SPECIFIED) meter device kit Use to test blood sugar 2 times daily or as directed. Preferred blood glucose meter OR supplies to accompany: Blood Glucose Monitor Brands: per insurance. 1 kit 0    metFORMIN (GLUCOPHAGE) 1000 MG tablet Take 0.5 tablets (500 mg) by mouth daily (with dinner) for 6 days, THEN 1 tablet (1,000 mg) daily (with dinner) for 4 days, THEN 1 tablet (1,000 mg) 2 times daily (with meals). 167 tablet 0    multivitamin w/minerals (MULTI-VITAMIN) tablet Take 1 tablet by mouth daily One-a-day Men      rosuvastatin (CRESTOR) 10 MG tablet Take 1 tablet (10 mg) by mouth daily. 90 tablet 4    thin (NO BRAND SPECIFIED)  lancets Use with lanceting device. To accompany: Blood Glucose Monitor Brands: per insurance. 100 each 6     No current facility-administered medications for this visit.          Review of Systems:    ROS: See HPI for pertinent details.  Remainder of 10-point ROS negative.         Physical Exam:   VS:  T: Data Unavailable    HR: 52    BP: 129/76    RR: Data Unavailable     GEN:  Alert.  NAD.   HEENT:  Sclerae anicteric.    CV:  No obvious jugular venous distension.  LUNGS: No respiratory distress, breathing comfortably wo accessory muscle use.  ABD:  ND.     SKIN:  Dry. No visible rashes.   NEURO:  CN grossly intact.          Laboratory Data:     Lab Results   Component Value Date    PSA 0.47 10/03/2024    PSA 0.57 12/11/2014     Lab Results   Component Value Date    CR 0.95 10/03/2024     Lab Results   Component Value Date    GFRESTIMATED >90 10/03/2024

## 2025-01-06 NOTE — PATIENT INSTRUCTIONS
Avoid excessive fluid intake in the 2 hours before bed    List of Common Bladder Irritants  Alcoholic beverages  Apples and apple juice  Cantaloupe  Carbonated beverages  Chili and spicy foods  Chocolate  Citrus fruit  Coffee (including decaffeinated)  Cranberries and cranberry juice  Grapes  Guava  Milk Products: milk, cheese, cottage cheese, yogurt, ice cream  Peaches  Pineapple  Plums  Strawberries  Sugar especially artificial sweeteners, saccharin, aspartame, corn sweeteners, honey, fructose, sucrose, lactose  Tea  Tomatoes and tomato juice  Vitamin B complex  Vinegar    Most people are not sensitive to ALL of these products; your goal is to find the foods that make YOUR symptoms worse    Try eliminating one or more of these foods from your diet and see if your symptoms improve. If your bladder symptoms are related to dietary factors, strict adherence to a diet that  eliminates the food should bring marked relief within 10 days. Once you are feeling better, you can begin to add foods back into your diet, one at a time. If symptoms return, you will be able to identify the irritant.

## 2025-05-20 ENCOUNTER — MYC MEDICAL ADVICE (OUTPATIENT)
Dept: INTERNAL MEDICINE | Facility: CLINIC | Age: 65
End: 2025-05-20
Payer: COMMERCIAL

## 2025-05-20 DIAGNOSIS — E11.65 TYPE 2 DIABETES MELLITUS WITH HYPERGLYCEMIA, WITHOUT LONG-TERM CURRENT USE OF INSULIN (H): Primary | ICD-10-CM

## 2025-05-22 ENCOUNTER — PATIENT OUTREACH (OUTPATIENT)
Dept: CARE COORDINATION | Facility: CLINIC | Age: 65
End: 2025-05-22
Payer: COMMERCIAL

## 2025-05-26 ENCOUNTER — PATIENT OUTREACH (OUTPATIENT)
Dept: CARE COORDINATION | Facility: CLINIC | Age: 65
End: 2025-05-26
Payer: COMMERCIAL

## 2025-09-03 ENCOUNTER — PATIENT OUTREACH (OUTPATIENT)
Dept: CARE COORDINATION | Facility: CLINIC | Age: 65
End: 2025-09-03
Payer: COMMERCIAL

## (undated) RX ORDER — FENTANYL CITRATE 50 UG/ML
INJECTION, SOLUTION INTRAMUSCULAR; INTRAVENOUS
Status: DISPENSED
Start: 2024-12-12

## (undated) RX ORDER — FENTANYL CITRATE 50 UG/ML
INJECTION, SOLUTION INTRAMUSCULAR; INTRAVENOUS
Status: DISPENSED
Start: 2017-11-14